# Patient Record
Sex: FEMALE | Race: WHITE | NOT HISPANIC OR LATINO
[De-identification: names, ages, dates, MRNs, and addresses within clinical notes are randomized per-mention and may not be internally consistent; named-entity substitution may affect disease eponyms.]

---

## 2017-06-28 ENCOUNTER — HOSPITAL (OUTPATIENT)
Dept: OTHER | Age: 37
End: 2017-06-28
Attending: INTERNAL MEDICINE

## 2018-09-27 ENCOUNTER — HOSPITAL (OUTPATIENT)
Dept: OTHER | Age: 38
End: 2018-09-27
Attending: INTERNAL MEDICINE

## 2019-03-30 ENCOUNTER — HOSPITAL (OUTPATIENT)
Dept: OTHER | Age: 39
End: 2019-03-30
Attending: OBSTETRICS & GYNECOLOGY

## 2019-04-05 ENCOUNTER — HOSPITAL (OUTPATIENT)
Dept: OTHER | Age: 39
End: 2019-04-05
Attending: OBSTETRICS & GYNECOLOGY

## 2019-10-14 ENCOUNTER — HOSPITAL (OUTPATIENT)
Dept: OTHER | Age: 39
End: 2019-10-14
Attending: SURGERY

## 2019-10-14 PROCEDURE — 99244 OFF/OP CNSLTJ NEW/EST MOD 40: CPT | Performed by: SURGERY

## 2019-11-18 ENCOUNTER — HOSPITAL (OUTPATIENT)
Dept: OTHER | Age: 39
End: 2019-11-18
Attending: SURGERY

## 2021-06-25 ENCOUNTER — OFFICE VISIT (OUTPATIENT)
Dept: DERMATOLOGY CLINIC | Facility: CLINIC | Age: 41
End: 2021-06-25
Payer: COMMERCIAL

## 2021-06-25 DIAGNOSIS — L42 PITYRIASIS ROSEA: Primary | ICD-10-CM

## 2021-06-25 PROCEDURE — 99203 OFFICE O/P NEW LOW 30 MIN: CPT | Performed by: PHYSICIAN ASSISTANT

## 2021-06-25 NOTE — PROGRESS NOTES
HPI:    Patient ID: Yu Werner is a 36year old female. Patient presents with itchy rash all over body for the past 2 weeks. Has been using clobetasol without relief. No new products noted. No recent illness noted.  Patient denies personal or family h

## 2021-09-22 ENCOUNTER — APPOINTMENT (OUTPATIENT)
Dept: GENERAL RADIOLOGY | Age: 41
End: 2021-09-22
Attending: PHYSICIAN ASSISTANT
Payer: COMMERCIAL

## 2021-09-22 ENCOUNTER — HOSPITAL ENCOUNTER (OUTPATIENT)
Age: 41
Discharge: HOME | End: 2021-09-22
Payer: COMMERCIAL

## 2021-09-22 VITALS
RESPIRATION RATE: 21 BRPM | TEMPERATURE: 98 F | OXYGEN SATURATION: 100 % | DIASTOLIC BLOOD PRESSURE: 68 MMHG | HEART RATE: 73 BPM | SYSTOLIC BLOOD PRESSURE: 121 MMHG

## 2021-09-22 DIAGNOSIS — J06.9 VIRAL URI WITH COUGH: Primary | ICD-10-CM

## 2021-09-22 PROCEDURE — 99203 OFFICE O/P NEW LOW 30 MIN: CPT

## 2021-09-22 PROCEDURE — 71046 X-RAY EXAM CHEST 2 VIEWS: CPT | Performed by: PHYSICIAN ASSISTANT

## 2021-09-22 PROCEDURE — 99213 OFFICE O/P EST LOW 20 MIN: CPT

## 2021-09-22 RX ORDER — BENZONATATE 100 MG/1
100 CAPSULE ORAL 3 TIMES DAILY PRN
Qty: 21 CAPSULE | Refills: 0 | Status: SHIPPED | OUTPATIENT
Start: 2021-09-22 | End: 2021-09-22

## 2021-09-22 RX ORDER — ALBUTEROL SULFATE 90 UG/1
2 AEROSOL, METERED RESPIRATORY (INHALATION) EVERY 4 HOURS PRN
Qty: 8 G | Refills: 0 | Status: SHIPPED | OUTPATIENT
Start: 2021-09-22 | End: 2021-10-22

## 2021-09-22 RX ORDER — BENZONATATE 100 MG/1
100 CAPSULE ORAL 3 TIMES DAILY PRN
Qty: 21 CAPSULE | Refills: 0 | Status: SHIPPED | OUTPATIENT
Start: 2021-09-22 | End: 2021-09-29

## 2021-09-22 NOTE — ED INITIAL ASSESSMENT (HPI)
Pt c/o sinus pain/pressure x 5 days, chest congestion and cough x 2 days.  Had negative covid over the weekend

## 2021-09-22 NOTE — ED PROVIDER NOTES
Patient Seen in: [facility]      History   Patient presents with:  Cough/URI    Stated Complaint: COUGH    Subjective:   HPI    40 yo female here for evaluation of cough, congestion, sore throat. Pt reports symptoms x 5 days.   Denies fever/c oriented to person, place, and time.    Psychiatric:         Mood and Affect: Mood normal.         Behavior: Behavior normal.             ED Course   Labs Reviewed - No data to display      26-year-old female here for evaluation of nasal congestion, cough,

## 2022-08-20 ENCOUNTER — OFFICE VISIT (OUTPATIENT)
Dept: FAMILY MEDICINE CLINIC | Facility: CLINIC | Age: 42
End: 2022-08-20
Payer: COMMERCIAL

## 2022-08-20 VITALS
DIASTOLIC BLOOD PRESSURE: 69 MMHG | RESPIRATION RATE: 16 BRPM | SYSTOLIC BLOOD PRESSURE: 115 MMHG | OXYGEN SATURATION: 100 % | TEMPERATURE: 99 F | HEART RATE: 71 BPM

## 2022-08-20 DIAGNOSIS — J02.9 SORE THROAT: Primary | ICD-10-CM

## 2022-08-20 DIAGNOSIS — Z20.818 EXPOSURE TO STREP THROAT: ICD-10-CM

## 2022-08-20 DIAGNOSIS — B34.9 VIRAL SYNDROME: ICD-10-CM

## 2022-08-20 LAB
CONTROL LINE PRESENT WITH A CLEAR BACKGROUND (YES/NO): YES YES/NO
KIT LOT #: NORMAL NUMERIC

## 2022-08-20 PROCEDURE — 87081 CULTURE SCREEN ONLY: CPT | Performed by: PHYSICIAN ASSISTANT

## 2022-08-20 RX ORDER — AMOXICILLIN 875 MG/1
875 TABLET, COATED ORAL 2 TIMES DAILY
Qty: 20 TABLET | Refills: 0 | Status: SHIPPED | OUTPATIENT
Start: 2022-08-20 | End: 2022-08-30

## 2022-08-20 RX ORDER — FLUCONAZOLE 150 MG/1
150 TABLET ORAL ONCE
Qty: 1 TABLET | Refills: 0 | Status: SHIPPED | OUTPATIENT
Start: 2022-08-20 | End: 2022-08-20

## 2022-08-21 LAB — SARS-COV-2 RNA RESP QL NAA+PROBE: NOT DETECTED

## 2022-09-02 PROBLEM — D24.1 FIBROADENOMA OF RIGHT BREAST IN FEMALE: Status: ACTIVE | Noted: 2020-02-03

## 2022-10-28 ENCOUNTER — OFFICE VISIT (OUTPATIENT)
Dept: FAMILY MEDICINE CLINIC | Facility: CLINIC | Age: 42
End: 2022-10-28
Payer: COMMERCIAL

## 2022-10-28 VITALS
DIASTOLIC BLOOD PRESSURE: 74 MMHG | BODY MASS INDEX: 23.07 KG/M2 | TEMPERATURE: 99 F | RESPIRATION RATE: 18 BRPM | HEART RATE: 85 BPM | HEIGHT: 62 IN | SYSTOLIC BLOOD PRESSURE: 114 MMHG | WEIGHT: 125.38 LBS | OXYGEN SATURATION: 100 %

## 2022-10-28 DIAGNOSIS — J20.9 ACUTE BRONCHITIS, UNSPECIFIED ORGANISM: Primary | ICD-10-CM

## 2022-10-28 PROCEDURE — 3008F BODY MASS INDEX DOCD: CPT | Performed by: NURSE PRACTITIONER

## 2022-10-28 PROCEDURE — 3074F SYST BP LT 130 MM HG: CPT | Performed by: NURSE PRACTITIONER

## 2022-10-28 PROCEDURE — 3078F DIAST BP <80 MM HG: CPT | Performed by: NURSE PRACTITIONER

## 2022-10-28 PROCEDURE — 99213 OFFICE O/P EST LOW 20 MIN: CPT | Performed by: NURSE PRACTITIONER

## 2022-10-28 RX ORDER — PREDNISONE 20 MG/1
40 TABLET ORAL DAILY
Qty: 10 TABLET | Refills: 0 | Status: SHIPPED | OUTPATIENT
Start: 2022-10-28 | End: 2022-11-02

## 2022-10-28 RX ORDER — ALBUTEROL SULFATE 90 UG/1
2 AEROSOL, METERED RESPIRATORY (INHALATION) EVERY 4 HOURS PRN
Qty: 1 EACH | Refills: 0 | Status: SHIPPED | OUTPATIENT
Start: 2022-10-28

## 2023-02-19 ENCOUNTER — OFFICE VISIT (OUTPATIENT)
Dept: FAMILY MEDICINE CLINIC | Facility: CLINIC | Age: 43
End: 2023-02-19
Payer: COMMERCIAL

## 2023-02-19 VITALS
BODY MASS INDEX: 21.49 KG/M2 | TEMPERATURE: 98 F | HEART RATE: 83 BPM | SYSTOLIC BLOOD PRESSURE: 112 MMHG | WEIGHT: 116.81 LBS | HEIGHT: 62 IN | RESPIRATION RATE: 18 BRPM | DIASTOLIC BLOOD PRESSURE: 62 MMHG | OXYGEN SATURATION: 100 %

## 2023-02-19 DIAGNOSIS — J02.0 STREP PHARYNGITIS: Primary | ICD-10-CM

## 2023-02-19 LAB
CONTROL LINE PRESENT WITH A CLEAR BACKGROUND (YES/NO): YES YES/NO
STREP GRP A CUL-SCR: POSITIVE

## 2023-02-19 RX ORDER — AMOXICILLIN 500 MG/1
500 CAPSULE ORAL 2 TIMES DAILY
Qty: 20 CAPSULE | Refills: 0 | Status: SHIPPED | OUTPATIENT
Start: 2023-02-19 | End: 2023-03-01

## 2023-02-19 RX ORDER — FLUCONAZOLE 150 MG/1
150 TABLET ORAL ONCE
Qty: 2 TABLET | Refills: 0 | Status: SHIPPED | OUTPATIENT
Start: 2023-02-19 | End: 2023-02-19

## 2023-05-05 ENCOUNTER — OFFICE VISIT (OUTPATIENT)
Dept: HEMATOLOGY/ONCOLOGY | Facility: HOSPITAL | Age: 43
End: 2023-05-05
Attending: INTERNAL MEDICINE
Payer: COMMERCIAL

## 2023-05-05 VITALS
HEIGHT: 62 IN | SYSTOLIC BLOOD PRESSURE: 108 MMHG | DIASTOLIC BLOOD PRESSURE: 68 MMHG | OXYGEN SATURATION: 98 % | BODY MASS INDEX: 20.89 KG/M2 | TEMPERATURE: 98 F | RESPIRATION RATE: 16 BRPM | HEART RATE: 85 BPM | WEIGHT: 113.5 LBS

## 2023-05-05 DIAGNOSIS — Z12.39 BREAST CANCER SCREENING, HIGH RISK PATIENT: ICD-10-CM

## 2023-05-05 DIAGNOSIS — Z98.82 H/O BILATERAL BREAST IMPLANTS: ICD-10-CM

## 2023-05-05 DIAGNOSIS — Z91.89 AT HIGH RISK FOR BREAST CANCER: Primary | ICD-10-CM

## 2023-05-05 DIAGNOSIS — Z80.3 FAMILY HISTORY OF BREAST CANCER IN FIRST DEGREE RELATIVE: ICD-10-CM

## 2023-05-05 DIAGNOSIS — Z12.31 SCREENING MAMMOGRAM FOR BREAST CANCER: ICD-10-CM

## 2023-05-05 PROCEDURE — 99211 OFF/OP EST MAY X REQ PHY/QHP: CPT

## 2023-09-27 ENCOUNTER — HOSPITAL ENCOUNTER (OUTPATIENT)
Dept: MAMMOGRAPHY | Facility: HOSPITAL | Age: 43
Discharge: HOME | End: 2023-09-27
Attending: INTERNAL MEDICINE
Payer: COMMERCIAL

## 2023-09-27 DIAGNOSIS — Z12.31 SCREENING MAMMOGRAM FOR BREAST CANCER: ICD-10-CM

## 2023-09-27 PROCEDURE — 77067 SCR MAMMO BI INCL CAD: CPT | Performed by: INTERNAL MEDICINE

## 2023-09-27 PROCEDURE — 77063 BREAST TOMOSYNTHESIS BI: CPT | Performed by: INTERNAL MEDICINE

## 2023-11-07 ENCOUNTER — OFFICE VISIT (OUTPATIENT)
Dept: HEMATOLOGY/ONCOLOGY | Facility: HOSPITAL | Age: 43
End: 2023-11-07
Attending: INTERNAL MEDICINE
Payer: COMMERCIAL

## 2023-11-07 VITALS
TEMPERATURE: 98 F | DIASTOLIC BLOOD PRESSURE: 51 MMHG | OXYGEN SATURATION: 100 % | HEIGHT: 62 IN | RESPIRATION RATE: 16 BRPM | WEIGHT: 111.19 LBS | HEART RATE: 86 BPM | BODY MASS INDEX: 20.46 KG/M2 | SYSTOLIC BLOOD PRESSURE: 111 MMHG

## 2023-11-07 DIAGNOSIS — Z91.89 AT HIGH RISK FOR BREAST CANCER: Primary | ICD-10-CM

## 2023-11-07 DIAGNOSIS — Z80.3 FAMILY HISTORY OF BREAST CANCER IN FIRST DEGREE RELATIVE: ICD-10-CM

## 2023-11-07 PROCEDURE — 99213 OFFICE O/P EST LOW 20 MIN: CPT | Performed by: INTERNAL MEDICINE

## 2024-03-11 ENCOUNTER — HOSPITAL ENCOUNTER (OUTPATIENT)
Dept: MRI IMAGING | Facility: HOSPITAL | Age: 44
Discharge: HOME | End: 2024-03-11
Attending: INTERNAL MEDICINE
Payer: COMMERCIAL

## 2024-03-11 DIAGNOSIS — Z12.39 BREAST CANCER SCREENING, HIGH RISK PATIENT: ICD-10-CM

## 2024-03-11 PROCEDURE — A9575 INJ GADOTERATE MEGLUMI 0.1ML: HCPCS | Performed by: INTERNAL MEDICINE

## 2024-03-11 PROCEDURE — 77049 MRI BREAST C-+ W/CAD BI: CPT | Performed by: INTERNAL MEDICINE

## 2024-03-11 RX ORDER — DIPHENHYDRAMINE HYDROCHLORIDE 50 MG/ML
10 INJECTION, SOLUTION INTRAMUSCULAR; INTRAVENOUS
Status: COMPLETED | OUTPATIENT
Start: 2024-03-11 | End: 2024-03-11

## 2024-03-11 RX ADMIN — DIPHENHYDRAMINE HYDROCHLORIDE 10 ML: 50 INJECTION, SOLUTION INTRAMUSCULAR; INTRAVENOUS at 14:05:00

## 2024-04-08 ENCOUNTER — OFFICE VISIT (OUTPATIENT)
Dept: HEMATOLOGY/ONCOLOGY | Facility: HOSPITAL | Age: 44
End: 2024-04-08
Attending: INTERNAL MEDICINE
Payer: COMMERCIAL

## 2024-04-08 VITALS
OXYGEN SATURATION: 100 % | RESPIRATION RATE: 16 BRPM | HEIGHT: 62 IN | TEMPERATURE: 98 F | DIASTOLIC BLOOD PRESSURE: 61 MMHG | BODY MASS INDEX: 21.16 KG/M2 | HEART RATE: 81 BPM | SYSTOLIC BLOOD PRESSURE: 116 MMHG | WEIGHT: 115 LBS

## 2024-04-08 DIAGNOSIS — Z91.89 AT HIGH RISK FOR BREAST CANCER: Primary | ICD-10-CM

## 2024-04-08 DIAGNOSIS — Z12.31 SCREENING MAMMOGRAM FOR BREAST CANCER: ICD-10-CM

## 2024-04-08 DIAGNOSIS — Z12.39 BREAST CANCER SCREENING, HIGH RISK PATIENT: ICD-10-CM

## 2024-04-08 DIAGNOSIS — Z98.82 H/O BILATERAL BREAST IMPLANTS: ICD-10-CM

## 2024-04-08 PROCEDURE — 99211 OFF/OP EST MAY X REQ PHY/QHP: CPT

## 2024-04-08 NOTE — PROGRESS NOTES
ZACHERY Ramires is a 43 year old female here for follow up of:  Encounter Diagnoses   Name Primary?    At high risk for breast cancer Yes    H/O bilateral breast implants     Screening mammogram for breast cancer     Breast cancer screening, high risk patient        Patient here for high risk breast evaluation due to her family history.  She follows at [facility] for her breast imaging, and on May 2022 she was evaluated by breast surgery.  She was having MRIs for high risk following at [facility] and had a right breast mass, which was consistent with a fibroadenoma.  The patient also has history of breast augmentation in 2008, and had met with plastic surgery for consideration of removal of the old implants and replacement with new ones.    Patient does complain of bilateral breast pain, which has remained stable.  She denied any further breast masses, nipple discharge, skin changes, or axillary masses.    Her mother had history of breast cancer at age 50, she is still alive, her maternal and had breast cancer at 49.  She states her mother had testing for BRCA and this was negative.  Her mother was tested again 4 yrs ago for new mutations and these were negative. Given that her mother was negative she was not recommended for genetic testing.     She was felt to be increased risk of breast cancer based on her family history, she did have a benign breast biopsy.  At the time of visit with Dr. Garvey, patient was interested in risk reducing mastectomies.  It was discussed with her at the time her risk estimates and it was recommended against prophylactic mastectomies.  It was discussed with the patient that risk-reducing surgery was indicated for patients with high risk genetic mutations, and for some patients with LCIS and significant family history.  Patient was to return to clinic with annual mammogram, and breast exam.  She also was to have annual breast MRIs.  Was also previously  evaluated at [facility] for the same.  This was back in 2019 when she initially presented that she was seen at [facility] by Dr. Ramires.  At that time her Salome score was 0.9% 5-year risk, therefore, she did not meet criteria for chemoprevention.  Initial presentation, her Ozzie Hernandez model lifetime risk was 28%, 10-year risk was 3.1%, per Salome model lifetime risk was 19.4% and 5-year risk was 0.9%.    She is on a high risk surveillance protocol given that her lifetime risk of breast cancer is greater than or equal to 20%, and is already undergoing alternating yearly mammogram with yearly MRI.    She states some pain that is mostly at night on the R breast or if has bag on the R arm.  States she still has breast tenderness and constant nipple sensitivity.  Hard time sleeping at night.  States has to position arms to sleep.  Sleep bras don't help.     Notices the R breast is higher.  Has a ripple on the R breast.  No other skin changes.      States that R breast feels harder than the L breast.      Breast Cancer Risk factors:    Age at menarche:  13  Menstrual Status:  Menstruates: Regularly    G 4 P 3  Age of first live birth:  29  Age of last live birth:  35     History of breastfeeding:  Yes.   Lifetime duration:   1 month        Hormonal contraceptive use:  Yes.  Lifetime duration:   6 years          Infertility treatments:  No                                                                                                                Previous benign biopsies:  Yes, benign  Prior breast surgery:   Yes, breast augmentation/implants 2008    Family Cancer History:  Breast Cancer-related family history includes Breast Cancer (age of onset: 46) in her maternal aunt; Breast Cancer (age of onset: 49) in her mother; Breast Cancer (age of onset: 50) in her maternal aunt and maternal aunt.    Ashkenazi Mandaeism Ancestry:   No                 Cancer Genetic Testing:  No, as above, mother tested  negative x2.      Review of Systems:   Review of Systems   Constitutional:  Positive for fatigue. Negative for appetite change and unexpected weight change.   Respiratory:  Negative for cough and shortness of breath.    Cardiovascular:  Negative for chest pain.   Gastrointestinal:  Negative for abdominal pain.   Genitourinary:          IUD in 2023 due to menorrhagia.  Has improved.    Musculoskeletal:  Negative for arthralgias and back pain.   Neurological:  Positive for headaches (chronic migraines, states these have been worse.). Negative for dizziness.   Hematological:  Negative for adenopathy.        Beta thalassemia trait states chronic anemia.   Psychiatric/Behavioral:  Positive for sleep disturbance.            Current Outpatient Medications   Medication Sig Dispense Refill    NP THYROID 30 MG Oral Tab TAKE 1 TABLET BY MOUTH EVERY DAY 90 tablet 0    UBRELVY 100 MG Oral Tab TAKE 1 TABLET ORALLY ONCE PER DAY AT ONSET OF MIGRAINE FOR 30 DAYS       Allergies:   Allergies   Allergen Reactions    Radiology Contrast Iodinated Dyes ANAPHYLAXIS    Shellfish ANAPHYLAXIS       Past Medical History:   Diagnosis Date    Acute, but ill-defined, cerebrovascular disease     Diabetes (HCC)     Essential hypertension      Past Surgical History:   Procedure Laterality Date          HERNIA SURGERY      NEEDLE BIOPSY RIGHT       Social History     Socioeconomic History    Marital status:    Tobacco Use    Smoking status: Never    Smokeless tobacco: Never   Vaping Use    Vaping Use: Never used   Substance and Sexual Activity    Alcohol use: Yes    Drug use: Never       Family History   Problem Relation Age of Onset    Breast Cancer Mother 49        BRCA negative    Brain Cancer Maternal Grandfather     Breast Cancer Maternal Aunt 46    Breast Cancer Maternal Aunt 50    Breast Cancer Maternal Aunt 50    Prostate Cancer Maternal Uncle 52         PHYSICAL EXAM:    /61 (BP Location: Left arm, Patient  Position: Sitting, Cuff Size: adult)   Pulse 81   Temp 97.6 °F (36.4 °C) (Oral)   Resp 16   Ht 1.575 m (5' 2\")   Wt 52.2 kg (115 lb)   LMP 08/23/2023 (Approximate)   SpO2 100%   BMI 21.03 kg/m²     Physical Exam  General: Patient is alert, not in acute distress.  HEENT: EOMs intact. PERRL.  Neck: No JVD. No palpable lymphadenopathy. Neck is supple.  Chest: Clear to auscultation.  Breasts: B implants with periareolar scars.  No masses. Scar tissue below implant prominent at the UOQ of the R breast.   Heart: Regular rate and rhythm.   Abdomen: Soft, non tender with good bowel sounds.  Extremities: No edema.  Neurological: Grossly intact.   Lymphatics: There is no palpable lymphadenopathy throughout in the cervical, supraclavicular, axillary, or inguinal regions.  Psych/Depression: nl        ASSESSMENT/PLAN:     1. At high risk for breast cancer    2. H/O bilateral breast implants    3. Screening mammogram for breast cancer    4. Breast cancer screening, high risk patient        Patient initially presented 2019.  When she initially presented that she was seen at [facility] by Dr. Ramires.  At that time her Salome score was 0.9% 5-year risk, therefore, she did not meet criteria for chemoprevention.  Initial presentation, her Tyer David model lifetime risk was 28%, 10-year risk was 3.1%, per Salome model lifetime risk was 19.4% and 5-year risk was 0.9%.    She is on a high risk surveillance protocol given that her lifetime risk of breast cancer is greater than or equal to 20%, and is already undergoing alternating yearly mammogram with yearly MRI.      States overdue for imaging.  Last mammogram in February of 2021.  MRI in February of 2022 with bx c/w fibroadenoma and PASH.    Patient completed bilateral mammogram September 2023 which was BI-RADS 2.  She will be due for yearly mammogram in September 2024.    She was due for MRI of the breasts March 2024 benign, due in 1 year.        States that her  implants were recalled, and placed in 2008.  She states she was told at [facility] plastic surgeon that this would not be covered by insurance.  Referral for plastic surgery was given to the patient    She has been encouraged to contact the office with any questions/concerns prior to her next appointment.    MDM low risk      No orders of the defined types were placed in this encounter.      Results From Past 48 Hours:  No results found for this or any previous visit (from the past 48 hour(s)).    Imaging & Referrals:  None   No orders of the defined types were placed in this encounter.    COMPARISON: [facility], [facility] RIVERA 2D+3D SCREENING AUGMT BILAT (76287/07546), 9/27/2023, 2:29 PM.  External Exams, ZIGGY POST PROCEDURAL IMAGE RIGHT (CPT=77065), 4/01/2022, 1:27 PM.  External Exams, US BREAST GUIDED CLIP PLACEMENT RT   (CPT=19285), 4/01/2022, 12:48 PM.  External Exams, ZIGGY RIVERA 2D+3D DIAGNOSTIC ZIGGY BILAT (DZH=65093/84656), 3/04/2022, 1:48 PM.  External Exams, US BREAST LEFT LIMITED  (CPT=76642), 2/22/2021, 2:27 PM.  External Exams, ZIGGY RIVERA 2D+3D DIAGNOSTIC ZIGGY BILAT  (QGR=66652/61182), 2/22/2021, 1:23 PM.  External Exams, ZIGGY RIVERA 2D+3D DIAGNOSTIC ZIGGY RIGHT (CPT=77065/61797), 7/02/2020, 10:18 AM.  External Exams, MRI BREAST BIOPSY W/CLIP 1 SITE RIGHT (CPT=19085), 1/07/2020, 9:47 AM.  External Exams, US BREAST RIGHT  LIMITED (CPT=76642), 1/06/2020, 11:12 AM.  External Exams, MRI BREAST (W+WO) W/CAD BILAT (CPT=77049), 12/09/2019, 9:26 AM.  External Exams, ZIGGY POST PROCEDURAL IMAGE RIGHT (CPT=77065), 11/25/2019, 1:38 PM.  External Exams, US BREAST BIOPSY 1 SITE RIGHT  (CPT=19083), 11/25/2019, 1:03 PM.  External Exams, US BREAST RIGHT LIMITED (CPT=76642), 11/25/2019, 11:58 AM.  External Exams, ZIGGY DIAGNOSTIC  RIGHT  (CPT=77065), 11/25/2019, 11:35 AM.  External Exams, US BREAST RIGHT LIMITED (CPT=76642), 3/04/2022, 2:18   PM.  External Exams, MRI BREAST (ROUTINE), BILATERAL (CPT=77059), 2/24/2022, 1:17  PM.     INDICATIONS: Z12.39 Breast cancer screening, high risk patient     BREAST COMPOSITION: Category d-Extremely dense, which lowers the sensitivity of mammography.     TECHNIQUE: Breast MRI was performed using dynamic intravenous gadolinium infusion, thin sections, and a dedicated breast coil.  Contrast enhancement analysis was assisted by computer-aided detection.       ENHANCEMENT PATTERN:   Marked, with >75% of glandular tissue demonstrating enhancement.       FINDINGS:     LMP:  03/04/2024     Of note:  (#/#)= (series/image number)  All measurements are listed in AP x trans x CC     There are bilateral prepectoral silicone breast prostheses, without evidence of extracapsular rupture.  There is a small amount of symmetric fluid signal along radial folds of both breast prostheses, similar to prior, which may be secondary to dual lumen   implant configuration.  No evidence of extracapsular implant rupture.     Right breast:    There is susceptibility artifact in the upper outer right breast corresponding to enhancing mass at the site of previous benign biopsy (series 12 image 21).  There is susceptibility artifact in the upper outer right breast corresponding to an enhancing  mass at the site a more remote prior benign biopsy (series 12, image 24).     There are non-unique foci of enhancement throughout the breast, most compatible with background enhancement. No abnormal enhancement is seen.       No abnormal right axillary or right internal mammary lymph nodes are seen.     Left breast:       There are non-unique foci of enhancement throughout the breast, most compatible with background enhancement.  No abnormal enhancement is seen.       No abnormal left axillary or left internal mammary lymph nodes are seen.     Extra mammary findings:  Trace amount of bilateral pleural fluid is thought to be physiologic.                 Impression  CONCLUSION:       No MRI evidence of malignancy in either breast.     Annual  screening breast MRI in conjunction with screening mammogram at alternating 6 month intervals is recommended, due to patient's high-risk status.        BI-RADS CATEGORY:  DIAGNOSTIC CATEGORY 2--BENIGN FINDING:       RECOMMENDATIONS:  ROUTINE MAMMOGRAM AND CLINICAL EVALUATION IN 6 MONTHS.       RETURN TO ANNUAL SCREENING IN 6 MONTHS BILATERAL BREASTS       SCREENING MRI OF THE BREAST IN ONE YEAR       PLEASE NOTE: A NORMAL MRI DOES NOT EXCLUDE THE POSSIBILITY OF BREAST CANCER.  A CLINICALLY SUSPICIOUS PALPABLE LUMP SHOULD BE BIOPSIED.                         Dictated by (CST): Karin Blair MD on 3/12/2024 at 6:56 AM      Finalized by (CST): Karin Blair MD on 3/12/2024 at 7:38 AM

## 2024-05-20 ENCOUNTER — HOSPITAL ENCOUNTER (OUTPATIENT)
Dept: ULTRASOUND IMAGING | Facility: HOSPITAL | Age: 44
Discharge: HOME | End: 2024-05-20
Attending: INTERNAL MEDICINE

## 2024-05-20 DIAGNOSIS — E04.1 CYSTIC THYROID NODULE: ICD-10-CM

## 2024-05-20 PROCEDURE — 76536 US EXAM OF HEAD AND NECK: CPT | Performed by: INTERNAL MEDICINE

## 2024-06-20 ENCOUNTER — HOSPITAL ENCOUNTER (OUTPATIENT)
Dept: CT IMAGING | Age: 44
Discharge: HOME | End: 2024-06-20
Attending: INTERNAL MEDICINE

## 2024-06-20 DIAGNOSIS — R22.1 NECK MASS: ICD-10-CM

## 2024-06-20 LAB
CREAT BLD-MCNC: 0.9 MG/DL
EGFRCR SERPLBLD CKD-EPI 2021: 81 ML/MIN/1.73M2 (ref 60–?)

## 2024-06-20 PROCEDURE — 82565 ASSAY OF CREATININE: CPT

## 2024-06-20 PROCEDURE — 70491 CT SOFT TISSUE NECK W/DYE: CPT | Performed by: INTERNAL MEDICINE

## 2024-10-09 ENCOUNTER — OFFICE VISIT (OUTPATIENT)
Dept: HEMATOLOGY/ONCOLOGY | Facility: HOSPITAL | Age: 44
End: 2024-10-09
Attending: INTERNAL MEDICINE
Payer: COMMERCIAL

## 2024-10-09 VITALS
BODY MASS INDEX: 21.57 KG/M2 | RESPIRATION RATE: 16 BRPM | DIASTOLIC BLOOD PRESSURE: 65 MMHG | WEIGHT: 117.19 LBS | OXYGEN SATURATION: 100 % | HEIGHT: 62 IN | TEMPERATURE: 99 F | SYSTOLIC BLOOD PRESSURE: 103 MMHG | HEART RATE: 93 BPM

## 2024-10-09 DIAGNOSIS — Z12.39 BREAST CANCER SCREENING OTHER THAN MAMMOGRAM: ICD-10-CM

## 2024-10-09 DIAGNOSIS — Z80.3 FAMILY HISTORY OF BREAST CANCER IN FIRST DEGREE RELATIVE: ICD-10-CM

## 2024-10-09 DIAGNOSIS — Z12.39 BREAST CANCER SCREENING, HIGH RISK PATIENT: ICD-10-CM

## 2024-10-09 DIAGNOSIS — Z91.89 AT HIGH RISK FOR BREAST CANCER: Primary | ICD-10-CM

## 2024-10-09 DIAGNOSIS — Z12.31 SCREENING MAMMOGRAM FOR BREAST CANCER: ICD-10-CM

## 2024-10-09 DIAGNOSIS — Z98.82 H/O BILATERAL BREAST IMPLANTS: ICD-10-CM

## 2024-10-09 PROCEDURE — 99213 OFFICE O/P EST LOW 20 MIN: CPT | Performed by: INTERNAL MEDICINE

## 2024-10-09 NOTE — PROGRESS NOTES
ZACHERY   Nereyda Ramires is a 44 year old female here for follow up of:  Encounter Diagnoses   Name Primary?    At high risk for breast cancer Yes    Family history of breast cancer in first degree relative     Breast cancer screening, high risk patient     Screening mammogram for breast cancer     H/O bilateral breast implants     Breast cancer screening other than mammogram        Patient here for high risk breast evaluation due to her family history.  She follows at [facility] for her breast imaging, and on May 2022 she was evaluated by breast surgery.  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  The patient also has history of breast augmentation in 2008, and had met with plastic surgery for consideration of removal of the old implants and replacement with new ones.    Patient does complain of bilateral breast pain, which has remained stable.  She denied any further breast masses, nipple discharge, skin changes, or axillary masses.    Her mother had history of breast cancer at age 50, she is still alive, her maternal and had breast cancer at 49.  She states her mother had testing for BRCA and this was negative.  Her mother was tested again 4 yrs ago for new mutations and these were negative. Given that her mother was negative she was not recommended for genetic testing.     She was felt to be increased risk of breast cancer based on her family history, she did have a benign breast biopsy.  At the time of visit with Dr. Garvey, patient was interested in risk reducing mastectomies.  It was discussed with her at the time her risk estimates and it was recommended against prophylactic mastectomies.  It was discussed with the patient that risk-reducing surgery was indicated for patients with high risk genetic mutations, and for some patients with LCIS and significant family history.  Patient was to return to  clinic with annual mammogram, and breast exam.  She also was to have annual breast MRIs.  Was also previously evaluated at [facility] for the same.  This was back in 2019 when she initially presented that she was seen at [facility] by Dr. Ramires.  At that time her Salome score was 0.9% 5-year risk, therefore, she did not meet criteria for chemoprevention.  Initial presentation, her Tyer David model lifetime risk was 28%, 10-year risk was 3.1%, per Salome model lifetime risk was 19.4% and 5-year risk was 0.9%.    She is on a high risk surveillance protocol given that her lifetime risk of breast cancer is greater than or equal to 20%, and is already undergoing alternating yearly mammogram with yearly MRI.    She states some pain that is mostly at night on the R breast.  States she still has breast tenderness and constant nipple sensitivity.  Hard time sleeping at night.  States has to position arms to sleep.  Sleep bras don't help.     Notices the R breast is higher.  Has a ripple on the R breast.  No other skin changes.      States that R breast feels harder than the L breast.      Breast Cancer Risk factors:    Age at menarche:  13  Menstrual Status: pre menopausal.    G 4 P 3  Age of first live birth:  29  Age of last live birth:  35     History of breastfeeding:  Yes.   Lifetime duration:   1 month        Hormonal contraceptive use:  Yes.  Lifetime duration:   6 years.  Has Mirena IUD will be 3 yrs January or February of 2025.  No periods.       Infertility treatments:  No                                                                                                                Previous benign biopsies:  Yes, benign  Prior breast surgery:   Yes, breast augmentation/implants 2007    Family Cancer History:  Breast Cancer-related family history includes Breast Cancer (age of onset: 46) in her maternal aunt; Breast Cancer (age of onset: 49) in her mother; Breast Cancer (age of onset: 50) in  her maternal aunt and maternal aunt.    Ashkenazi Zoroastrian Ancestry:   No                 Cancer Genetic Testing:  No, as above, mother tested negative x2.      Review of Systems:   Review of Systems   Constitutional:  Positive for fatigue. Negative for appetite change and unexpected weight change.   HENT:   Positive for voice change (hoarse for a month).    Respiratory:  Negative for cough and shortness of breath.    Cardiovascular:  Negative for chest pain.   Gastrointestinal:  Negative for abdominal pain.   Musculoskeletal:  Negative for arthralgias, back pain and neck pain.   Neurological:  Positive for headaches (chronic migraines, states these have improved on a new medication.). Negative for dizziness.   Hematological:  Negative for adenopathy.        Beta thalassemia trait states chronic anemia.   Psychiatric/Behavioral:  Positive for sleep disturbance.            Current Outpatient Medications   Medication Sig Dispense Refill    EPINEPHrine (EPIPEN 2-MARGOTH) 0.3 MG/0.3ML Injection Solution Auto-injector Inject 0.3 mL (1 each total) as directed as needed. 1 each 1    Atogepant (QULIPTA) 10 MG Oral Tab Take 10 mg by mouth daily. 90 tablet 1    pantoprazole (PROTONIX) 40 MG Oral Tab EC Take 1 tablet (40 mg total) by mouth every morning before breakfast. 90 tablet 1    NP THYROID 30 MG Oral Tab TAKE 1 TABLET BY MOUTH EVERY DAY 90 tablet 0    UBRELVY 100 MG Oral Tab TAKE 1 TABLET ORALLY ONCE PER DAY AT ONSET OF MIGRAINE FOR 30 DAYS       Allergies:   Allergies   Allergen Reactions    Shellfish ANAPHYLAXIS       Past Medical History:    Acute, but ill-defined, cerebrovascular disease    Diabetes (HCC)    Essential hypertension     Past Surgical History:   Procedure Laterality Date          Hernia surgery      Needle biopsy right       Social History     Socioeconomic History    Marital status:    Tobacco Use    Smoking status: Never    Smokeless tobacco: Never   Vaping Use    Vaping status: Never Used    Substance and Sexual Activity    Alcohol use: Yes    Drug use: Never       Family History   Problem Relation Age of Onset    Breast Cancer Mother 49        BRCA negative    Brain Cancer Maternal Grandfather     Breast Cancer Maternal Aunt 46    Breast Cancer Maternal Aunt 50    Breast Cancer Maternal Aunt 50    Prostate Cancer Maternal Uncle 52         PHYSICAL EXAM:    /65 (BP Location: Left arm, Patient Position: Sitting, Cuff Size: adult)   Pulse 93   Temp 98.8 °F (37.1 °C) (Oral)   Resp 16   Ht 1.575 m (5' 2\")   Wt 53.2 kg (117 lb 3.2 oz)   SpO2 100%   BMI 21.44 kg/m²   Wt Readings from Last 6 Encounters:   10/09/24 53.2 kg (117 lb 3.2 oz)   04/11/24 52.7 kg (116 lb 3.2 oz)   04/08/24 52.2 kg (115 lb)   11/07/23 50.4 kg (111 lb 3.2 oz)   08/08/23 52.2 kg (115 lb)   06/19/23 52.3 kg (115 lb 6.4 oz)   Physical Exam  General: Patient is alert, not in acute distress.  HEENT: EOMs intact. PERRL.  Neck: No JVD. No palpable lymphadenopathy. Neck is supple.  Chest: Clear to auscultation.  Breasts: B implants with periareolar scars.  No masses. Scar tissue below implant prominent at the UOQ of the R breast.   Heart: Regular rate and rhythm.   Abdomen: Soft, non tender with good bowel sounds.  Extremities: No edema.  Neurological: Grossly intact.   Lymphatics: There is no palpable lymphadenopathy throughout in the cervical, supraclavicular, axillary, or inguinal regions.  Psych/Depression: nl        ASSESSMENT/PLAN:     1. At high risk for breast cancer    2. Family history of breast cancer in first degree relative    3. Breast cancer screening, high risk patient    4. Screening mammogram for breast cancer    5. H/O bilateral breast implants    6. Breast cancer screening other than mammogram        Patient initially presented 2019.  When she initially presented that she was seen at [facility] by Dr. Ramires.  At that time her Salome score was 0.9% 5-year risk, therefore, she did not meet criteria  for chemoprevention.  Initial presentation, her Ozzie Hernandez model lifetime risk was 28%, 10-year risk was 3.1%, per Salome model lifetime risk was 19.4% and 5-year risk was 0.9%.    She is on a high risk surveillance protocol given that her lifetime risk of breast cancer is greater than or equal to 20%, and is already undergoing alternating yearly mammogram with yearly MRI.      States overdue for imaging.  Last mammogram in February of 2021.  MRI in February of 2022 with bx c/w fibroadenoma and PASH.    Patient completed bilateral mammogram September 2023 which was BI-RADS 2.  She will be due for yearly mammogram in September 2024.  Patient advised to have ASAP.    MRI of the breasts March 2024 benign, due in 1 year, March of 2025.        States that her implants were recalled, and placed in 2008.  She states she was told at [facility] plastic surgeon that this would not be covered by insurance.  Referral for plastic surgery was given to the patient    She has been encouraged to contact the office with any questions/concerns prior to her next appointment.    MDM low risk      No orders of the defined types were placed in this encounter.      Results From Past 48 Hours:  No results found for this or any previous visit (from the past 48 hours).    Imaging & Referrals:  MRI BREAST (W+WO) W/CAD BILAT (CPT=77049)   No orders of the defined types were placed in this encounter.    COMPARISON: [facility], [facility] RIVERA 2D+3D SCREENING AUGMT BILAT ([id number]), 9/27/2023, 2:29 PM.  External Exams, [facility] POST PROCEDURAL IMAGE RIGHT (CPT=77065), 4/01/2022, 1:27 PM.  External Exams, US BREAST GUIDED CLIP PLACEMENT RT   (CPT=19285), 4/01/2022, 12:48 PM.  External Exams, [facility] RIVERA 2D+3D DIAGNOSTIC ZIGGY BILAT (HBT=10060/52446), 3/04/2022, 1:48 PM.  External Exams, US BREAST LEFT LIMITED  (CPT=76642), 2/22/2021, 2:27 PM.  External Exams, [facility] RIVERA 2D+3D DIAGNOSTIC ZIGGY BILAT  (RSM=[id number]/01743), 2/22/2021, 1:23 PM.  External  Exams, ZIGGY RIVERA 2D+3D DIAGNOSTIC ZIGGY RIGHT (CPT=77065/27069), 7/02/2020, 10:18 AM.  External Exams, MRI BREAST BIOPSY W/CLIP 1 SITE RIGHT (CPT=19085), 1/07/2020, 9:47 AM.  External Exams, US BREAST RIGHT  LIMITED (CPT=76642), 1/06/2020, 11:12 AM.  External Exams, MRI BREAST (W+WO) W/CAD BILAT (CPT=77049), 12/09/2019, 9:26 AM.  External Exams, ZIGGY POST PROCEDURAL IMAGE RIGHT (CPT=77065), 11/25/2019, 1:38 PM.  External Exams, US BREAST BIOPSY 1 SITE RIGHT  (CPT=19083), 11/25/2019, 1:03 PM.  External Exams, US BREAST RIGHT LIMITED (CPT=76642), 11/25/2019, 11:58 AM.  External Exams, ZIGGY DIAGNOSTIC  RIGHT  (CPT=77065), 11/25/2019, 11:35 AM.  External Exams, US BREAST RIGHT LIMITED (CPT=76642), 3/04/2022, 2:18   PM.  External Exams, MRI BREAST (ROUTINE), BILATERAL (CPT=77059), 2/24/2022, 1:17 PM.     INDICATIONS: Z12.39 Breast cancer screening, high risk patient     BREAST COMPOSITION: Category d-Extremely dense, which lowers the sensitivity of mammography.     TECHNIQUE: Breast MRI was performed using dynamic intravenous gadolinium infusion, thin sections, and a dedicated breast coil.  Contrast enhancement analysis was assisted by computer-aided detection.       ENHANCEMENT PATTERN:   Marked, with >75% of glandular tissue demonstrating enhancement.       FINDINGS:     LMP:  03/04/2024     Of note:  (#/#)= (series/image number)  All measurements are listed in AP x trans x CC     There are bilateral prepectoral silicone breast prostheses, without evidence of extracapsular rupture.  There is a small amount of symmetric fluid signal along radial folds of both breast prostheses, similar to prior, which may be secondary to dual lumen   implant configuration.  No evidence of extracapsular implant rupture.     Right breast:    There is susceptibility artifact in the upper outer right breast corresponding to enhancing mass at the site of previous benign biopsy (series 12 image 21).  There is susceptibility artifact in the upper  outer right breast corresponding to an enhancing  mass at the site a more remote prior benign biopsy (series 12, image 24).     There are non-unique foci of enhancement throughout the breast, most compatible with background enhancement. No abnormal enhancement is seen.       No abnormal right axillary or right internal mammary lymph nodes are seen.     Left breast:       There are non-unique foci of enhancement throughout the breast, most compatible with background enhancement.  No abnormal enhancement is seen.       No abnormal left axillary or left internal mammary lymph nodes are seen.     Extra mammary findings:  Trace amount of bilateral pleural fluid is thought to be physiologic.                 Impression  CONCLUSION:       No MRI evidence of malignancy in either breast.     Annual screening breast MRI in conjunction with screening mammogram at alternating 6 month intervals is recommended, due to patient's high-risk status.        BI-RADS CATEGORY:  DIAGNOSTIC CATEGORY 2--BENIGN FINDING:       RECOMMENDATIONS:  ROUTINE MAMMOGRAM AND CLINICAL EVALUATION IN 6 MONTHS.       RETURN TO ANNUAL SCREENING IN 6 MONTHS BILATERAL BREASTS       SCREENING MRI OF THE BREAST IN ONE YEAR       PLEASE NOTE: A NORMAL MRI DOES NOT EXCLUDE THE POSSIBILITY OF BREAST CANCER.  A CLINICALLY SUSPICIOUS PALPABLE LUMP SHOULD BE BIOPSIED.                         Dictated by (CST): Karin Blair MD on 3/12/2024 at 6:56 AM      Finalized by (CST): Karin Blair MD on 3/12/2024 at 7:38 AM

## 2024-10-28 ENCOUNTER — HOSPITAL ENCOUNTER (OUTPATIENT)
Dept: MAMMOGRAPHY | Facility: HOSPITAL | Age: 44
Discharge: HOME | End: 2024-10-28
Attending: INTERNAL MEDICINE
Payer: COMMERCIAL

## 2024-10-28 DIAGNOSIS — Z12.31 SCREENING MAMMOGRAM FOR BREAST CANCER: ICD-10-CM

## 2024-10-28 PROCEDURE — 77063 BREAST TOMOSYNTHESIS BI: CPT | Performed by: INTERNAL MEDICINE

## 2024-10-28 PROCEDURE — 77067 SCR MAMMO BI INCL CAD: CPT | Performed by: INTERNAL MEDICINE

## 2025-01-09 ENCOUNTER — TELEPHONE (OUTPATIENT)
Dept: SURGERY | Facility: CLINIC | Age: 45
End: 2025-01-09

## 2025-01-09 NOTE — TELEPHONE ENCOUNTER
Contacted patient to obtain additional information for her upcoming consultation. Confirmed with patient that she received documentation stating her implants are recalled. Requested she provide this information as well as her implant cards to us via [employer]. Patient verbalized understanding and will send the information.

## 2025-01-14 ENCOUNTER — OFFICE VISIT (OUTPATIENT)
Dept: SURGERY | Facility: CLINIC | Age: 45
End: 2025-01-14
Payer: COMMERCIAL

## 2025-01-14 VITALS
RESPIRATION RATE: 14 BRPM | HEART RATE: 77 BPM | DIASTOLIC BLOOD PRESSURE: 79 MMHG | SYSTOLIC BLOOD PRESSURE: 113 MMHG | HEIGHT: 62 IN | BODY MASS INDEX: 20.61 KG/M2 | OXYGEN SATURATION: 99 % | WEIGHT: 112 LBS

## 2025-01-14 DIAGNOSIS — Z80.3 FAMILY HISTORY OF BREAST CANCER IN FIRST DEGREE RELATIVE: Primary | ICD-10-CM

## 2025-01-14 PROCEDURE — 99204 OFFICE O/P NEW MOD 45 MIN: CPT | Performed by: SURGERY

## 2025-01-14 NOTE — CONSULTS
New Patient Consultation    This is the first visit for this 44 year old female referred for evaluation of her cosmetic breast implants.    History of Present Illness:   The patient is a 44 year old female referred for evaluation of her cosmetic breast implants.  The patient had bilateral subglandular breast augmentation in  by Dr. Remi Curiel.  Review of the patient's implants shows him to be style 120 440 cc silicone filled breast implants.  The patient received a notice about the recall of her textured implants.  She now presents here today to discuss treatment options.  Patient is followed by Dr. Delgado due to her strong family history of breast cancer in her mother, maternal aunt, and multiple paternal great aunts.  Her last breast imaging was mammography which was performed in October and showed benign findings.  The patient was referred here to discuss surgical treatment options.  Specifically, she is interested in the option of implant exchange versus mastectomy and reconstruction.    Past Medical History:      Past Medical History:    Acute, but ill-defined, cerebrovascular disease    Diabetes (HCC)    Essential hypertension         Past Surgical History:  Past Surgical History:   Procedure Laterality Date          Hernia surgery      Needle biopsy right           Medications:    Medications Ordered Prior to Encounter[1]      Allergies:    Allergies[2]      Family History:   Family History   Problem Relation Age of Onset    Breast Cancer Mother 49        BRCA negative    Brain Cancer Maternal Grandfather     Breast Cancer Maternal Aunt 46    Breast Cancer Maternal Aunt 50    Breast Cancer Maternal Aunt 50    Prostate Cancer Maternal Uncle 52         Social History:  History   Alcohol Use    Yes       History   Smoking Status    Never   Smokeless Tobacco    Never       History   Drug Use Unknown           Review of Systems:    General:   The patient denies, fever, chills, night sweats,  fatigue, generalized weakness, change in appetite, weight loss, or weight gain.    Endocrine:   There is no history of poor/slow wound healing, weight loss/gain, fertility or hormone problems, cold intolerance, heat intolerance, excessive thirst, or thyroid disease.     Allergic/Immunologic:  There is no history of hives, hay fever, angioedema or anaphylaxis.    HEENT:    The patient denies ear pain, ear drainage, hearing loss, change in vision, double vision, cataracts, glaucoma, nasal congestion, nosebleed, hoarseness, sore throat, or swollen glands    Respiratory:   The patient denies shortness of breath, cough, bloody cough, phlegm, asthma, or wheezing    Cardiovascular:  The patient denies chest pain/pressure, palpitations, irregular heartbeat, high blood pressure, stroke, or leg swelling    Breasts:  Patient denies breast masses, pain, change in the breast skin, skin dimpling, nipple discharge, or rash    Gastrointestinal:   There is no history of difficulty or pain with swallowing, reflux symptoms, nausea, vomiting, dark/ bloody stools, diarrhea, constipation,  change in bowel habits, or abdominal pain.     Genitourinary:  The patient denies frequent urination, needing to get up at night to urinate, urinary hesitancy or retaining urine, painful urination, urinary incontinence, decreased urine stream, blood in the urine, or vaginal/penile discharge.    Skin:   The patient denies rash, itching, skin lesions, dry skin, change in skin color or change in moles, sunburns, or sunburns with blistering.     Hematologic/Lymphatic:  The patient denies easily bruising or bleeding, persistent swollen glands or lymph nodes, bleeding disorders, blood clots, or pulmonary embolism.     Gynecologic:  The patient denies irregular menses, pelvic pain, pain with intercourse, painful menses, or pregnancy    Musculoskeletal:  The patient denies muscle aches/pain, joint pain, stiff joints, neck pain, back pain or bone  pain.    Neurologic:  There is no history of migraines or severe headaches, seizure/epilepsy, speech problems, coordination problems, trembling/tremors, fainting/black outs, dizziness, memory problems, loss of sensation/numbness, problems walking, weakness, tingling or burning in hands/feet.     Psychiatric:  There is no history of abusive relationship, bipolar disorder, sleep disturbance, anxiety, depression or feeling of despair.    Physical Exam:  Vitals:    01/14/25 1059   BP: 113/79   Pulse: 77   Resp: 14     Body mass index is 20.49 kg/m².,    The patient is awake, alert, and oriented.  She is a well-nourished, well-developed female who appears her stated age.  Her speech patterns and movements are normal, and affect is appropriate.    HEENT: The head is normocephalic. The neck is supple. The thyroid is not enlarged and is without palpable masses.  The trachea is in the midline. Conjunctiva are clear, non-icteric.    Breasts: General Inspection of the breasts shows well-healed circumareolar scars.  A firm subcutaneous capsule is noted bilaterally.  A paucity of breast parenchyma is noted with an approximately 1.5 to 2 cm pinch test throughout.  There are no dominant masses noted bilaterally.  There is no nipple inversion or nipple discharge noted bilaterally.  Measurements: Sternal notch to nipple 24 cm in the left and 23 cm on the right.  Base diameter is 12 cm bilaterally.  Nipple to very fold is 12 cm on the left and 13 cm on the right.    Lymph Nodes:  There is no cervical, supraclavicular, or axillary lymphadenopathy appreciated.    Back: There is no vertebral column tenderness.    Skin: The skin appears normal. There are no suspicious appearing rashes or lesions.    Extremities: The extremities are without deformity, cyanosis or edema.    Impression:   The patient is a 44 year old female with a history of subglandular breast augmentation with round textured implants as well as a strong family history of  breast cancer.    Discussion and Plan:  Surgical treatment options were reviewed with the patient.  To begin, we discussed the option of implant exchange.  Given the paucity of subcutaneous fat, we discussed downsizing to a smaller implant with placement of biosynthetic mesh and secondary mastopexy.  The nature of the mastopexy Wise pattern scars was demonstrated to the patient.  We reviewed the elevated risk of nipple areolar complications including loss of sensation and nipple areolar necrosis given her previous periareolar incisions.  We also discussed the likely need for revisions.  We discussed the general risks of surgery including but not limited to bleeding, infection, scarring, delayed wound healing, loss of nipple sensation, nipple areolar necrosis, mesh palpability, visibility, or extrusion, implant infection or extrusion requiring removal, ALCL, capsular contracture, and need for further surgery.  We discussed expected postoperative course including need for drains, activity limitation, and compression.    Given the patient's desires, we also discussed the option of bilateral prophylactic mastectomy and implant-based reconstruction.We reviewed the options for post-mastectomy reconstruction and discussed the risks/benefits of  timing (immediate versus delayed) and technique (implant-based versus autologous).  Given the patient's previous abdominoplasty, the majority of discussion was focused on implant-based reconstruction. Specifically, the nature and technique of tissue expander reconstruction was reviewed with the patient. We discussed the partial submuscular placement of the tissue expander, use of acellular matrix, and placement of drains.  We discussed that use of acellular dermal matrix in  breast reconstruction is considered an \"off label\" use.  The expansion process, as well as the need for a secondary procedure for placement of a permanent implant, were reviewed.  The risks of surgery including  but not limited to bleeding, infection, scarring, delayed wound healing, nipple areolar necrosis, seroma, asymmetry, implant infection/extrusion requiring removal, injury to adjacent structures, capsular contracture, ALCL, need for implant exchange, and need for further surgery were reviewed. We also discussed the possibility of direct to implant reconstruction based on operative findings. The patient understands that the decision on direct to implant reconstruction will be based on operative findings. She also understand the possible need for revision surgery if direct to implant reconstruction is undertaken. The expected post-operative course was discussed including the need for activity limitation, drains, and suture removal.  We discussed the recommended FDA surveillance protocol for silicone implants.    Multiple questions were answered to the patient's satisfaction. No guarantees as to outcome offered.  The patient would like to meet with Dr. Garcia and will return for preoperative evaluation if she chooses to proceed with mastectomy and reconstruction.  The plan was reviewed with the patient and questions were answered.         [1]   Current Outpatient Medications on File Prior to Visit   Medication Sig Dispense Refill    QULIPTA 10 MG Oral Tab TAKE 10 MG BY MOUTH DAILY. 90 tablet 1    EPINEPHrine (EPIPEN 2-MARGOTH) 0.3 MG/0.3ML Injection Solution Auto-injector Inject 0.3 mL (1 each total) as directed as needed. 1 each 1    pantoprazole (PROTONIX) 40 MG Oral Tab EC Take 1 tablet (40 mg total) by mouth every morning before breakfast. 90 tablet 1    NP THYROID 30 MG Oral Tab TAKE 1 TABLET BY MOUTH EVERY DAY 90 tablet 0    UBRELVY 100 MG Oral Tab TAKE 1 TABLET ORALLY ONCE PER DAY AT ONSET OF MIGRAINE FOR 30 DAYS       No current facility-administered medications on file prior to visit.   [2]   Allergies  Allergen Reactions    Shellfish ANAPHYLAXIS

## 2025-02-26 ENCOUNTER — OFFICE VISIT (OUTPATIENT)
Facility: CLINIC | Age: 45
End: 2025-02-26
Payer: COMMERCIAL

## 2025-02-26 VITALS
BODY MASS INDEX: 21 KG/M2 | RESPIRATION RATE: 16 BRPM | SYSTOLIC BLOOD PRESSURE: 120 MMHG | DIASTOLIC BLOOD PRESSURE: 76 MMHG | WEIGHT: 117 LBS | TEMPERATURE: 97 F | OXYGEN SATURATION: 96 % | HEART RATE: 86 BPM

## 2025-02-26 DIAGNOSIS — D24.1 FIBROADENOMA OF RIGHT BREAST IN FEMALE: ICD-10-CM

## 2025-02-26 DIAGNOSIS — R92.30 DENSE BREAST TISSUE: ICD-10-CM

## 2025-02-26 DIAGNOSIS — Z91.89 AT HIGH RISK FOR BREAST CANCER: Primary | ICD-10-CM

## 2025-02-26 DIAGNOSIS — Z80.3 FAMILY HISTORY OF BREAST CANCER: ICD-10-CM

## 2025-02-26 DIAGNOSIS — Z80.3 FAMILY HISTORY OF BREAST CANCER IN FIRST DEGREE RELATIVE: ICD-10-CM

## 2025-02-26 PROCEDURE — 99204 OFFICE O/P NEW MOD 45 MIN: CPT | Performed by: SURGERY

## 2025-02-26 NOTE — PATIENT INSTRUCTIONS
Dr. Saskia Garcia  Tel: [phone]  Fax: [phone] [place] [address] [place] [address]  [phone]     Surgery/Procedure: Bilateral breast nipple sparing mastectomies (Soniafredimadan) with reconstruction(Brant)       Anesthesia:   Gen +request intraoperative pec block from anesthesia Surgery Length:   2 hours CPT:  97177   Wire LOC:   No   Nuc Med:   No   Pepper Seed:  No       Dx & ICD-10: At high risk for breast cancer (Z91.89), Family history of breast cancer (Z80.3)   Radiology Instructions: N/A   _______________________________________________________________________________    Someone must accompany you the day of the procedure to drive you home safely, because of anesthesia.  You will need an adult  to stay with you the first night following your surgery.  You must remove any kind of makeup, acrylic nails, lotions, powders, creams or deodorant.  [facility] ONLY: Pre-admission will give instruct you on when to take Gatorade and Tylenol/acetaminophen prior to your surgery, purchase 2 - 12oz bottles of regular Gatorade (NOT RED/SUGAR FREE). Otherwise, you may not eat or drink anything else after 11PM the night before surgery.    [facility] ONLY: You may not eat or drink anything after midnight the day of your surgery.   Wear comfortable clothing that can be easily removed.  If you wear dentures, contacts lenses, or any prosthesis, you will be asked to remove them.  Do not drink alcohol or smoke 24 hours prior to your procedure.  Bring a picture ID and your insurance card.  Covid-19 testing is no longer required before surgery unless you are experiencing symptoms such as fever, cough, congestion, etc.   The Pre-Admission Testing Department will call the day before to confirm your procedure, give you the time you need to arrive by and directions on where to go. They begin making calls after 2pm, if you are not contacted by 4pm, please call the surgeon's office listed  above.  Do not take any blood thinners at least one week prior to the procedure/surgery. This includes aspirin, baby aspirin, Ibuprofen products, herbal supplements, diet medications, vitamin E, fish oil and green tea supplements. Please check other supplements for these ingredients. *TYLENOL or acetaminophen is acceptable*  If you take Coumadin, Plavix, Xarelto, or Eliquis, please contact your prescribing physician for special instructions on how long to hold. If you take insulin contact your primary care physician for special instructions.  Our surgery scheduler, Kiah, will be contacting you to discuss surgery dates. If you have any questions related to scheduling your surgery, please reach out to her at [phone].  _____________________________________________________________________  PRE-OPERATIVE TESTING IF INDICATED BELOW  PLEASE COMPLETE ASAP (AT LEAST 14-21 DAYS PRIOR TO SURGERY)  [] CBC [] BMP [] CMP [] EKG    [] PT, PTT, INR [] Cardiac Clearance  [] H&P Medical Clearance [] Chest X-ray     Please call Central Scheduling to schedule an appointment for pre-operative labs/tests once your orders are placed @ [phone]  Does the patient have a pacemaker or ICD?                              Faxitron/Trident in OR?  [] Yes   [x] No                               [] Yes   [x] No

## 2025-02-26 NOTE — PROGRESS NOTES
Breast Surgery New Patient Consultation    This is the first visit for this 44 year old woman, referred by Dr. Noland, who presents for evaluation of high risk for development of breast cancer.    History of Present Illness:   Ms. Nereyda Ramires is a 44 year old woman who presents with high risk for development of breast cancer secondary to her strong family history of breast cancer and history of personal breast biopsies and dense breast tissue.  Acutely, she denies any palpable masses, nipple discharge or axillary symptoms.  She does have consistent pain in her breast.  She has a history of a bilateral breast augmentation from  and has had some pain and scar tissue related to her implants.  She does have a strong family history of breast cancer with no known genetic mutation in her family.  Prior breast biopsies have included biopsies in  and  which demonstrated benign fibroadenoma and at least 1 location.  She had her most recent bilateral screening mammogram on 2024 which showed extremely dense breast tissue with intact biopsy clips of the right breast and no suspicious new changes.  She had a high risk MRI on 2024 that showed intact bilateral prepectoral silicone implants with some fluid along the fold of the prosthesis bilaterally and no suspicious enhancement of the breast tissue on either side.  She has been seen by plastic surgery and was noted to have a recall of her current breast implants.  She is motivated to proceed with a bilateral risk reducing mastectomy to lower future breast cancer risk and presents today to discuss.         Past Medical History:    Beta thalassemia minor    Migraine       Past Surgical History:   Procedure Laterality Date    Abdominoplasty  2016    Dr. Remi Curiel          , ,     Hernia surgery  2016    Needle biopsy right      Other surgical history  2008    bilateral breast augmentation, implants Dr Curiel        Gynecological History:  Pt is a   Pt was 29 years old at time of first pregnancy.    She has cumulative breastfeeding history of 1 months.  She achieved menarche at age 13. and LMP unknown, pt has an IUD  She denies any history of hormone replacement therapy   She has history of oral contraceptive use for 6 years, last in .  She denies infertility treatment to achieve pregnancy.    Medications:    No outpatient medications have been marked as taking for the 25 encounter (Appointment) with Saskia Garcia MD.       Allergies:    Allergies[1]    Family History:   Family History   Problem Relation Age of Onset    Breast Cancer Mother 49        BRCA negative    Brain Cancer Maternal Grandfather     Breast Cancer Maternal Aunt 46    Breast Cancer Maternal Aunt 50    Breast Cancer Maternal Aunt 50    Prostate Cancer Maternal Uncle 52       She is not of Ashkenazi Jainism ancestry.    Social History:  History   Alcohol Use    3.0 standard drinks of alcohol/week    3 Standard drinks or equivalent per week       History   Smoking Status    Never   Smokeless Tobacco    Never     Ms. Nereyda Ramires is  with 3 children. She has 7 siblings. She is currently Employed full-time    Review of Systems:  General:   The patient denies, fever, chills, +night sweats, fatigue, generalized weakness, change in appetite or weight loss.    HEENT:     The patient denies eye irritation, cataracts, redness, glaucoma, yellowing of the eyes, change in vision, color blindness, or +wearing contacts/glasses. The patient denies hearing loss, ringing in the ears, ear drainage, earaches, nasal congestion, nose bleeds, snoring, pain in mouth/throat, hoarseness, change in voice, facial trauma.    Respiratory:  The patient denies chronic cough, phlegm, hemoptysis, pleurisy/chest pain, pneumonia, asthma, wheezing, difficulty in breathing with exertion, emphysema, chronic bronchitis, shortness of breath or abnormal sound when  breathing.     Cardiovascular:  There is no history of chest pain, chest pressure/discomfort, palpitations, irregular heartbeat, fainting or near-fainting, difficulty breathing when lying flat, SOB/Coughing at night, swelling of the legs or chest pain while walking.    Breasts:  See history of present illness    Gastrointestinal:     There is no history of difficulty or pain with swallowing, reflux symptoms, vomiting, dark or bloody stools, constipation, yellowing of the skin, indigestion, nausea, change in bowel habits, diarrhea, abdominal pain or vomiting blood.     Genitourinary:  The patient denies frequent urination, needing to get up at night to urinate, urinary hesitancy or retaining urine, painful urination, urinary incontinence, decreased urine stream, blood in the urine or vaginal/penile discharge.    Skin:    The patient denies rash, itching, skin lesions, dry skin, change in skin color or change in moles.     Hematologic/Lymphatic:  The patient denies easily bruising or bleeding or persistent swollen glands or lymph nodes.     Musculoskeletal:  The patient denies muscle aches/pain, joint pain, stiff joints, neck pain, back pain or bone pain.    Neuropsychiatric:  There is no history of +migraines or severe headaches, seizure/epilepsy, speech problems, coordination problems, trembling/tremors, fainting/black outs, dizziness, memory problems, loss of sensation/numbness, problems walking, weakness, tingling or burning in hands/feet. There is no history of abusive relationship, bipolar disorder, sleep disturbance, anxiety, depression or feeling of despair.    Endocrine:    There is no history of poor/slow wound healing, weight loss/gain, fertility or hormone problems, cold intolerance, thyroid disease.     Allergic/Immunologic:  There is no history of hives, hay fever, angioedema or anaphylaxis.    /76 (BP Location: Left arm, Patient Position: Sitting, Cuff Size: adult)   Pulse 86   Temp 96.8 °F (36  °C) (Temporal)   Resp 16   Wt 53.1 kg (117 lb)   SpO2 96%   BMI 21.40 kg/m²     Physical Exam:  The patient is an alert, oriented, well-nourished and  well-developed woman who appears her stated age. Her speech patterns and movements are normal. Her affect is appropriate.    HEENT: The head is normocephalic. The neck is supple. The thyroid is not enlarged and is without palpable masses/nodules. There are no palpable masses. The trachea is in the midline. Conjunctiva are clear, non-icteric.    Chest: The chest expands symmetrically. The lungs are clear to auscultation.    Heart: The rhythm is regular.  There are no murmurs, rubs, gallops or thrills.    Breasts:  Her breasts are symmetrical with a cup size 32DDD.  There are bilateral well-healed incisions with intact subglandular bilateral breast implants.  Right breast: The skin, nipple ,and areola appear normal. There is no skin dimpling with movement of the pectoralis. There is no nipple retraction. No nipple discharge can be elicited. The parenchyma is mildly nodular. There are no dominant masses in the breast. The axillary tail is normal.  Left breast:   The skin, nipple, and areola appear normal. There is no skin dimpling with movement of the pectoralis. There is no nipple retraction. No nipple discharge can be elicited. The parenchyma is mildly nodular. There are no dominant masses in the breast. The axillary tail is normal.    Abdomen:  The abdomen is soft, flat and non tender. The liver is not enlarged. There are no palpable masses.    Lymph Nodes:  The supraclavicular, axillary and cervical regions are free of significant lymphadenopathy.    Back: There is no vertebral column tenderness.    Skin: The skin appears normal. There are no suspicious appearing rashes or lesions.    Extremities: The extremities are without deformity, cyanosis or edema.    Impression:   Ms. Nereyda Ramires is a 44 year old woman presents with strong family history of breast  cancer, dense breast tissue, history of benign right breast biopsies, history of bilateral breast augmentation with recalled breast implants and breast pain.    Discussion and Plan:  I had a discussion with the Patient regarding her breast exam. On exam today I found no evidence of clinical concern in bilateral breast tissue.  I personally reviewed the recent imaging which is concordant with her clinical exam.  She is due for her next high rescreening MRI in March 2025 and I have advised that she proceed with this prior to any surgical scheduling.  She is interested in a bilateral risk reducing nipple sparing mastectomy for maximal risk reduction given her high risk for development of breast cancer secondary to her family history.  The risks and possible complications of this procedure were discussed at length with the patient.  Additionally, the patient has met with plastic surgery to discuss her reconstructive options.  I will touch base with her with the MRI results prior to surgical planning.  She was given ample opportunity for questions and those questions were answered to her satisfaction. She has been  encouraged to contact the office with any questions or concerns prior to her next appointment.     This note was created by [employer] voice recognition. Errors in content may be related to improper recognition by the system; efforts to review and correct have been done but errors may still exist. Please be advised the primary purpose of this note is for me to communicate medical care. Standard sentence structure is not always used. Medical terminology and medical abbreviations may be used. There may be grammatical, typographical, and automated fill ins that may have errors missed in proofreading.           [1]   Allergies  Allergen Reactions    Shellfish ANAPHYLAXIS

## 2025-02-27 PROBLEM — Z80.3 FAMILY HISTORY OF BREAST CANCER: Status: ACTIVE | Noted: 2025-02-27

## 2025-02-27 PROBLEM — R92.30 DENSE BREAST TISSUE: Status: ACTIVE | Noted: 2025-02-27

## 2025-03-28 ENCOUNTER — HOSPITAL ENCOUNTER (OUTPATIENT)
Dept: MRI IMAGING | Facility: HOSPITAL | Age: 45
Discharge: HOME | End: 2025-03-28
Attending: SURGERY
Payer: COMMERCIAL

## 2025-03-28 DIAGNOSIS — Z91.89 AT HIGH RISK FOR BREAST CANCER: ICD-10-CM

## 2025-03-28 DIAGNOSIS — Z80.3 FAMILY HISTORY OF BREAST CANCER: ICD-10-CM

## 2025-03-28 DIAGNOSIS — R92.30 DENSE BREAST TISSUE: ICD-10-CM

## 2025-03-28 PROCEDURE — 77049 MRI BREAST C-+ W/CAD BI: CPT | Performed by: SURGERY

## 2025-03-28 PROCEDURE — A9575 INJ GADOTERATE MEGLUMI 0.1ML: HCPCS | Performed by: SURGERY

## 2025-03-28 RX ORDER — GADOTERATE MEGLUMINE 376.9 MG/ML
15 INJECTION INTRAVENOUS
Status: COMPLETED | OUTPATIENT
Start: 2025-03-28 | End: 2025-03-28

## 2025-03-28 RX ADMIN — GADOTERATE MEGLUMINE 11 ML: 376.9 INJECTION INTRAVENOUS at 13:16:00

## 2025-03-31 DIAGNOSIS — R92.8 ABNORMAL MRI, BREAST: Primary | ICD-10-CM

## 2025-04-07 ENCOUNTER — HOSPITAL ENCOUNTER (OUTPATIENT)
Dept: ULTRASOUND IMAGING | Facility: HOSPITAL | Age: 45
Discharge: HOME | End: 2025-04-07
Attending: SURGERY
Payer: COMMERCIAL

## 2025-04-07 DIAGNOSIS — R92.8 ABNORMAL MRI, BREAST: ICD-10-CM

## 2025-04-07 PROCEDURE — 76642 ULTRASOUND BREAST LIMITED: CPT | Performed by: SURGERY

## 2025-04-08 ENCOUNTER — TELEPHONE (OUTPATIENT)
Age: 45
End: 2025-04-08

## 2025-04-08 NOTE — TELEPHONE ENCOUNTER
Attempted to reach patient to offer appointment times with Dr. Noland as provided by Sherri LARA. No answer. Left voicemail for patient to call us back to reschedule.

## 2025-04-08 NOTE — TELEPHONE ENCOUNTER
Patient called to cancel her 4/9 follow-up appointment due to being out of town for a business meeting. Patient states she is back on Friday.     Please share any other upcoming time patient could reschedule to see Dr. Noland.     Patient states she sees Dr. Garcia and Dr. Guo upcoming.

## 2025-04-09 ENCOUNTER — APPOINTMENT (OUTPATIENT)
Age: 45
End: 2025-04-09
Attending: INTERNAL MEDICINE
Payer: COMMERCIAL

## 2025-04-15 ENCOUNTER — OFFICE VISIT (OUTPATIENT)
Facility: CLINIC | Age: 45
End: 2025-04-15
Payer: COMMERCIAL

## 2025-04-15 DIAGNOSIS — Z80.3 FAMILY HISTORY OF BREAST CANCER: Primary | ICD-10-CM

## 2025-04-15 PROCEDURE — 99213 OFFICE O/P EST LOW 20 MIN: CPT | Performed by: SURGERY

## 2025-04-15 NOTE — PATIENT INSTRUCTIONS
Surgeon:         Dr. Herber Guo                                        Tel:         [phone]                                  Fax:        [phone]    Surgery/Procedure: Immediate breast reconstruction with placement of bilateral breast tissue expanders and acellular dermal matrix, possible direct to silicone implant. 2 hours, general anesthesia, outpatient. Joint with Dr. Garcia.  Please request pre-op pec block per anesthesia.        Dx Code: Z91.89     Hospital:  [facility]: [address] [place] [address]           [phone]  [facility]: [address] [place] [address]               [phone]    1. Someone will need to drive you to and from the hospital if your procedure is outpatient.    2.Do not drink alcohol or smoke 24 hours prior to your procedure.    3. Bring a picture ID and your insurance card.    4. You will be contacted by the hospital the day before to confirm the procedure time and location.     5. Do not take any herbal supplements or blood thinners at least one week before your procedure/surgery. This includes NSAID's (aspirin, baby aspirin, Motrin, Ibuprofen, Aleve, Advil, Naproxen, etc), Fish oil, vitamin E, turmeric, CoQ10, or green tea supplements, etc. *TYLENOL or acetaminophen is ok to take*    6. PRE-OPERATIVE TESTING: History and physical with medical clearance is REQUIRED within 30 days of the surgery date and is mandatory per Dr. Guo. *If this is not done, your surgery will be postponed. To avoid delays with your clearance, please ensure your PCP appointment is at least 14 days prior to your surgical date.*  MEDICAL CLEARANCE WITH DR. Freeman  CBC  CMP  EKG (within 90 days)    7. If you take Coumadin, Plavix, Xarelto, or Eliquis, please contact your prescribing physician for special instructions on how long to hold. If you take insulin, contact your primary care physician for special instructions.     8. Please inform us if you start or  change any medications at least one week before surgery (ex: blood thinners, weight loss medications, diabetic medications, herbal supplements, etc)    9. Does patient have diagnosis of sleep apnea?    [   ] Yes     [ X ]  No    Consent obtained  Photos taken on 4/15/2025

## 2025-04-15 NOTE — PROGRESS NOTES
Nereyda Ramires is a 44 year old female who presents today in follow-up.   Since her last visit, the patient was seen by Dr. Garcia and she is elected undergo bilateral prophylactic mastectomy.  She presents here today for preoperative evaluation and to review the reconstructive plan.    Physical Examination:  Breasts: General Inspection of the breasts shows well-healed circumareolar scars.  A firm subcutaneous capsule is noted bilaterally.  A paucity of breast parenchyma is noted with an approximately 1.5 to 2 cm pinch test throughout.  There are no dominant masses noted bilaterally.  There is no nipple inversion or nipple discharge noted bilaterally.  Measurements: Sternal notch to nipple 24 cm in the left and 23 cm on the right.  Base diameter is 12 cm bilaterally.  Nipple to very fold is 12 cm on the left and 13 cm on the right.     Lymph Nodes:  There is no cervical, supraclavicular, or axillary lymphadenopathy appreciated.    Assessment and Plan:  We reviewed the plan for bilateral mastectomy by Dr. Garcia and immediate implant-based reconstruction.  Given the patient's desire to be smaller than her current size, I recommended a skin sparing approach to her mastectomy.    Specifically, the nature and technique of tissue expander reconstruction was reviewed with the patient. We discussed the pre-pectoral placement of the tissue expander, use of acellular matrix, and placement of drains.  We discussed that use of acellular dermal matrix in  breast reconstruction is considered an \"off label\" use.  The expansion process, as well as the need for a secondary procedure for placement of a permanent implant, were reviewed.  Representative illustrations and photographs were demonstrated to the patient. The risks of surgery including but not limited to bleeding, infection, scarring, delayed wound healing, seroma, asymmetry, implant infection/extrusion requiring removal, injury to adjacent structures, capsular  contracture, ALCL, need for implant exchange, and need for further surgery were reviewed. We also discussed the possibility of direct to implant reconstruction based on operative findings. The patient understands that the decision on direct to implant reconstruction will be based on operative findings. She also understand the possible need for revision surgery if direct to implant reconstruction is undertaken. The expected post-operative course was discussed including the need for activity limitation, drains, and suture removal.  We discussed the recommended FDA surveillance protocol for silicone implants.    Multiple questions were answered to the patient's satisfaction. No guarantees as to outcome offered. The patient expresses understanding and wishes to proceed. A total of 20 minutes was spent reviewing the patient's history and diagnostic testing, examining the patient, reviewing reconstructive options, and coordinating the patient's care.

## 2025-04-23 ENCOUNTER — TELEPHONE (OUTPATIENT)
Dept: SURGERY | Facility: CLINIC | Age: 45
End: 2025-04-23

## 2025-04-23 DIAGNOSIS — Z80.3 FAMILY HISTORY OF BREAST CANCER: ICD-10-CM

## 2025-04-23 DIAGNOSIS — Z91.89 AT HIGH RISK FOR BREAST CANCER: Primary | ICD-10-CM

## 2025-04-23 NOTE — TELEPHONE ENCOUNTER
Calling pt in regards to scheduling surgery.  Informed pt that I have 07/10/2025 available at [facility] with Dr. Garcia/Dr. Guo.  Pt verbalized understanding and in agreement with date and location.  All questions answered.   Encouraged pt to call or [facility] message office with any other questions or concerns.

## 2025-04-25 ENCOUNTER — OFFICE VISIT (OUTPATIENT)
Age: 45
End: 2025-04-25
Attending: INTERNAL MEDICINE
Payer: COMMERCIAL

## 2025-04-25 VITALS
BODY MASS INDEX: 20.8 KG/M2 | TEMPERATURE: 98 F | RESPIRATION RATE: 16 BRPM | HEART RATE: 73 BPM | HEIGHT: 62 IN | SYSTOLIC BLOOD PRESSURE: 117 MMHG | OXYGEN SATURATION: 100 % | DIASTOLIC BLOOD PRESSURE: 78 MMHG | WEIGHT: 113 LBS

## 2025-04-25 DIAGNOSIS — Z12.31 SCREENING MAMMOGRAM FOR BREAST CANCER: ICD-10-CM

## 2025-04-25 DIAGNOSIS — R92.30 DENSE BREAST TISSUE: ICD-10-CM

## 2025-04-25 DIAGNOSIS — R92.8 ABNORMAL MRI, BREAST: ICD-10-CM

## 2025-04-25 DIAGNOSIS — Z98.82 H/O BILATERAL BREAST IMPLANTS: ICD-10-CM

## 2025-04-25 DIAGNOSIS — Z80.3 FAMILY HISTORY OF BREAST CANCER: ICD-10-CM

## 2025-04-25 DIAGNOSIS — Z80.3 FAMILY HISTORY OF BREAST CANCER IN FIRST DEGREE RELATIVE: ICD-10-CM

## 2025-04-25 DIAGNOSIS — Z91.89 AT HIGH RISK FOR BREAST CANCER: Primary | ICD-10-CM

## 2025-04-25 DIAGNOSIS — Z12.39 BREAST CANCER SCREENING, HIGH RISK PATIENT: ICD-10-CM

## 2025-04-25 DIAGNOSIS — Z12.39 BREAST CANCER SCREENING OTHER THAN MAMMOGRAM: ICD-10-CM

## 2025-04-25 NOTE — PROGRESS NOTES
ZACHERY   Nereyda Ramires is a 44 year old female here for follow up of:  Encounter Diagnoses   Name Primary?    At high risk for breast cancer Yes    Family history of breast cancer in first degree relative     Breast cancer screening, high risk patient     Screening mammogram for breast cancer     H/O bilateral breast implants     Breast cancer screening other than mammogram        Patient here for high risk breast evaluation due to her family history.  She follows at [facility] for her breast imaging, and on May 2022 she was evaluated by breast surgery.  She was having MRIs for high risk following at [facility] and had a right breast mass, which was consistent with a fibroadenoma.  The patient also has history of breast augmentation in 2008, and had met with plastic surgery for consideration of removal of the old implants and replacement with new ones.    Patient does complain of bilateral breast pain, which has remained stable.  She denied any further breast masses, nipple discharge, skin changes, or axillary masses.    Her mother had history of breast cancer at age 50, she is still alive, her maternal and had breast cancer at 49.  She states her mother had testing for BRCA and this was negative.  Her mother was tested again 4 yrs ago for new mutations and these were negative. Given that her mother was negative she was not recommended for genetic testing.     She was felt to be increased risk of breast cancer based on her family history, she did have a benign breast biopsy.  At the time of visit with Dr. Garvey, patient was interested in risk reducing mastectomies.  It was discussed with her at the time her risk estimates and it was recommended against prophylactic mastectomies.  It was discussed with the patient that risk-reducing surgery was indicated for patients with high risk genetic mutations, and for some patients with LCIS and significant family history.  Patient was to return to  clinic with annual mammogram, and breast exam.  She also was to have annual breast MRIs.  Was also previously evaluated at [facility] for the same.  This was back in 2019 when she initially presented that she was seen at [facility] by Dr. Ramires.  At that time her Salome score was 0.9% 5-year risk, therefore, she did not meet criteria for chemoprevention.  Initial presentation, her Tyer David model lifetime risk was 28%, 10-year risk was 3.1%, per Salome model lifetime risk was 19.4% and 5-year risk was 0.9%.    She is on a high risk surveillance protocol given that her lifetime risk of breast cancer is greater than or equal to 20%, and is already undergoing alternating yearly mammogram with yearly MRI.    She had MRI end of March and was recommend for US which was negative.     She is scheduled for B mastectomies and reconstructions in July 2025.    Breast Cancer Risk factors:    Age at menarche:  13  Menstrual Status: pre menopausal.    G 4 P 3  Age of first live birth:  29  Age of last live birth:  35     History of breastfeeding:  Yes.   Lifetime duration:   1 month        Hormonal contraceptive use:  Yes.  Lifetime duration:   6 years.  Has Mirena IUD will be 3 yrs January or February of 2025.  No periods.       Infertility treatments:  No                                                                                                                Previous benign biopsies:  Yes, benign  Prior breast surgery:   Yes, breast augmentation/implants 2007    Family Cancer History:  Breast Cancer-related family history includes Breast Cancer (age of onset: 46) in her maternal aunt; Breast Cancer (age of onset: 49) in her mother; Breast Cancer (age of onset: 50) in her maternal aunt and maternal aunt.    Ashkenazi Mandaen Ancestry:   No                 Cancer Genetic Testing:  No, as above, mother tested negative x2.      Review of Systems:   Review of Systems   Constitutional:  Positive for  fatigue. Negative for appetite change and unexpected weight change.   Respiratory:  Negative for cough and shortness of breath.    Cardiovascular:  Negative for chest pain.   Gastrointestinal:  Negative for abdominal pain.   Musculoskeletal:  Negative for arthralgias, back pain and neck pain.   Neurological:  Positive for headaches (chronic migraines, states these have improved on medications as needed). Negative for dizziness.   Hematological:  Negative for adenopathy.        Beta thalassemia trait states chronic anemia.   Psychiatric/Behavioral:  Positive for sleep disturbance.            Current Outpatient Medications   Medication Sig Dispense Refill    QULIPTA 10 MG Oral Tab TAKE 10 MG BY MOUTH DAILY. 90 tablet 1    EPINEPHrine (EPIPEN 2-MARGOTH) 0.3 MG/0.3ML Injection Solution Auto-injector Inject 0.3 mL (1 each total) as directed as needed. 1 each 1    pantoprazole (PROTONIX) 40 MG Oral Tab EC Take 1 tablet (40 mg total) by mouth every morning before breakfast. 90 tablet 1    NP THYROID 30 MG Oral Tab TAKE 1 TABLET BY MOUTH EVERY DAY 90 tablet 0    UBRELVY 100 MG Oral Tab        Allergies:   Allergies   Allergen Reactions    Shellfish ANAPHYLAXIS       Past Medical History:    Beta thalassemia minor    Migraine     Past Surgical History:   Procedure Laterality Date    Abdominoplasty  2016    Dr. Remi Curiel          , ,     Hernia surgery  2016    Needle biopsy right      Other surgical history  2008    bilateral breast augmentation, implants Dr Curiel     Social History     Socioeconomic History    Marital status:    Tobacco Use    Smoking status: Never    Smokeless tobacco: Never   Vaping Use    Vaping status: Never Used   Substance and Sexual Activity    Alcohol use: Yes     Alcohol/week: 3.0 standard drinks of alcohol     Types: 3 Standard drinks or equivalent per week    Drug use: Never       Family History   Problem Relation Age of Onset    Breast Cancer Mother 49        BRCA  negative    Brain Cancer Maternal Grandfather     Breast Cancer Maternal Aunt 46    Breast Cancer Maternal Aunt 50    Breast Cancer Maternal Aunt 50    Prostate Cancer Maternal Uncle 52         PHYSICAL EXAM:    /78 (BP Location: Left arm, Patient Position: Sitting, Cuff Size: adult)   Pulse 73   Temp 98.4 °F (36.9 °C) (Oral)   Resp 16   Ht 1.575 m (5' 2\")   Wt 51.3 kg (113 lb)   SpO2 100%   BMI 20.67 kg/m²   Wt Readings from Last 6 Encounters:   04/25/25 51.3 kg (113 lb)   02/26/25 53.1 kg (117 lb)   01/14/25 50.8 kg (112 lb)   10/09/24 53.2 kg (117 lb 3.2 oz)   04/11/24 52.7 kg (116 lb 3.2 oz)   04/08/24 52.2 kg (115 lb)   Physical Exam  General: Patient is alert, not in acute distress.  HEENT: EOMs intact. PERRL.  Neck: No JVD. No palpable lymphadenopathy. Neck is supple.  Chest: Clear to auscultation.  Breasts: B implants with periareolar scars.  No masses. Scar tissue below implant prominent at the UOQ of the R breast.   Heart: Regular rate and rhythm.   Abdomen: Soft, non tender with good bowel sounds.  Extremities: No edema.  Neurological: Grossly intact.   Lymphatics: There is no palpable lymphadenopathy throughout in the cervical, supraclavicular, axillary, or inguinal regions.  Psych/Depression: nl        ASSESSMENT/PLAN:     1. At high risk for breast cancer    2. Family history of breast cancer in first degree relative    3. Breast cancer screening, high risk patient    4. Screening mammogram for breast cancer    5. H/O bilateral breast implants    6. Breast cancer screening other than mammogram        Patient initially presented 2019.  When she initially presented that she was seen at [facility] by Dr. Ramires.  At that time her Slaome score was 0.9% 5-year risk, therefore, she did not meet criteria for chemoprevention.  Initial presentation, her Tyer [place] model lifetime risk was 28%, 10-year risk was 3.1%, per Salome model lifetime risk was 19.4% and 5-year risk was 0.9%.    She  is on a high risk surveillance protocol given that her lifetime risk of breast cancer is greater than or equal to 20%, and is already undergoing alternating yearly mammogram with yearly MRI.    Patient had mammogram on 10/28/2024 BI-RADS 2    MRI of the breast on 3/20/2025 which had shown a 1.3 cm enhancing mass.  Targeted ultrasound of the right breast upper inner portion was recommended.  This was on 4/7/2025 which did not show any correlating findings.     Patient is scheduled for bilateral nipple sparing mastectomies with immediate reconstruction 7/10/2025.  She would not need further imaging after this.  She will not need any further follow-up with myself after bilateral mastectomies as this will complete her prophylaxis.       MDM low risk      No orders of the defined types were placed in this encounter.      Results From Past 48 Hours:  No results found for this or any previous visit (from the past 48 hours).    Imaging & Referrals:  None   No orders of the defined types were placed in this encounter.    PROCEDURE: MRI BREAST (W+WO) W/CAD BILAT (CPT=77049)      COMPARISON: [facility], [facility] RIVERA 2D+3D SCREENING [facility] BILAT ([id number]), 10/28/2024, 3:27 PM.  [facility], MRI BREAST (W+WO) W/CAD BILAT (CPT=77049), 3/11/2024, 1:17 PM.  External Exams, MRI BREAST (ROUTINE),   BILATERAL (CPT=77059), 2/24/2022, 1:17 PM.  External Exams, MRI BREAST BIOPSY W/CLIP 1 SITE RIGHT (CPT=19085), 1/07/2020, 9:47 AM.  External Exams, MRI BREAST (W+WO) W/CAD BILAT (CPT=77049), 12/09/2019, 9:26 AM.      INDICATIONS: Ms. Ramires is a 44 year old female presenting for high risk MRI.      BREAST COMPOSITION: Category D - The breasts are extremely dense, which lowers the sensitivity of mammography.      TECHNIQUE: Breast MRI was performed using dynamic intravenous gadolinium infusion, thin sections, and a dedicated breast coil.  Contrast enhancement analysis was assisted by computer-aided  detection.       ENHANCEMENT PATTERN:   Moderate, with 50-75% of glandular tissue demonstrating enhancement.     FINDINGS: In the right upper inner breast, mid depth, there is a progressively enhancing mass with homogeneous enhancement measuring 1.3 x 0.9 x 0.3 cm.  This may have been present on prior examination from 03/2024 although this is uncertain due to   differences in technique.      In the left breast, no suspicious enhancing masses or non-mass enhancement are identified.               Impression  CONCLUSION:    1. 1.3 cm enhancing mass with progressive kinetics in the right upper inner breast. A targeted right breast US is recommended for further evaluation. If no sonographic correlate is identified, a short term follow-up MRI is recommended in 6 months in   order to ensure stability of these findings.      2. No MRI evidence of malignancy of the left breast.      BI-RADS CATEGORY:   DIAGNOSTIC CATEGORY 0 - INCOMPLETE ASSESSMENT: NEED ADDITIONAL IMAGING EVALUATION.       RECOMMENDATIONS:   ULTRASOUND: RIGHT BREAST  --We will call the patient back for an ultrasound and provide an additional report.       PLEASE NOTE: A NORMAL MRI DOES NOT EXCLUDE THE POSSIBILITY OF BREAST CANCER.  A CLINICALLY SUSPICIOUS PALPABLE LUMP SHOULD BE BIOPSIED.                         Dictated by (CST): Sharmin Rushing MD on 3/31/2025 at 1:10 PM       Finalized by (CST): Sharmin Rushing MD on 3/31/2025 at 1:35 PM         PROCEDURE: US BREAST RIGHT LIMITED (CPT=76642)     COMPARISON: External Exams, US BREAST RIGHT LIMITED (CPT=76642), 3/04/2022, 2:18 PM.  External Exams, US BREAST LEFT LIMITED  (CPT=76642), 2/22/2021, 2:27 PM.  External Exams, US BREAST RIGHT LIMITED (CPT=76642), 1/06/2020, 11:12 AM.  External Exams, US  BREAST RIGHT LIMITED (CPT=76642), 11/25/2019, 11:58 AM.     INDICATIONS: Abnormal MRI, breast  Family history of breast cancer in mother at the age of 49, and 3 maternal aunts at the age of (46, 50, 50). Recent breast  MRI dated 3/28/2025showed a 13mm enhancing mass in right upper inner quadrant for which MRI directed ultrasound was recommended.  TECHNIQUE: Breast ultrasound was performed with evaluation only on specific areas of concern (right upper inner quadrant).       FINDINGS: Targeted MRI directed ultrasound was performed in the right breast upper inner quadrant, 5 cm from the nipple. No suspicious cystic or solid mass is identified in the targeted ultrasound. Dense breast tissue abutting the silicone implant is  noted.                    Impression   CONCLUSION:   13 mm enhancing mass on MRI in the right upper inner breast without sonographic correlate needs short term follow up MRI in 6 months to confirm stability.     BI-RADS CATEGORY:    DIAGNOSTIC CATEGORY 3 - PROBABLY BENIGN FINDING.       RECOMMENDATIONS:  SHORT TERM FOLLOW-UP BREAST MRI BILATERAL BREASTS IN 6 MONTHS.         Your patient's answers to the health and family history questions collected during this mammogram indicate a potentially increased risk for breast cancer. It is recommended that this patient be evaluated in our Cancer Risk Assessment Clinic to determine  eligibility for additional breast cancer screening, risk reduction strategies and/or genetic testing. Providers are encouraged to contact our breast navigator, Patty Gan, at [phone] with any questions or for guidance regarding this  recommendation.           Dictated by (CST): Carmelina Turpin MD on 4/07/2025 at 11:55 AM      Finalized by (CST): Carmelina Turpin MD on 4/07/2025 at 12:14 PM

## 2025-05-07 ENCOUNTER — TELEPHONE (OUTPATIENT)
Dept: SURGERY | Facility: CLINIC | Age: 45
End: 2025-05-07

## 2025-05-07 NOTE — TELEPHONE ENCOUNTER
Called pt, she answered phone, then call hung up, tried calling 3 times and goes to . Wanted to give her the   forms department #[phone]  to send her FMLA or if she has questions regarding FMLA to call forms department we don't handle FMLA any more.  Fax [phone]

## 2025-05-14 ENCOUNTER — PATIENT MESSAGE (OUTPATIENT)
Dept: SURGERY | Facility: CLINIC | Age: 45
End: 2025-05-14

## 2025-05-27 ENCOUNTER — TELEPHONE (OUTPATIENT)
Facility: CLINIC | Age: 45
End: 2025-05-27

## 2025-06-02 NOTE — TELEPHONE ENCOUNTER
Patient called back gathered the below;    Type of Leave: short term disability   Reason for Leave: Procedure on 07/10/2025  Start date of leave: 07/10/2025  End date of leave: 4-6 wks recovery   How many flare ups per month/length?:  How many appts per month/length?:   Was Fee and Turnaround info Given?: Yes   Adv'd processing agent

## 2025-06-03 NOTE — TELEPHONE ENCOUNTER
Dr. Garcia/ Paty MACK      Please sign off on form if you agree to: Disability  Start date of leave: 07/10/2025  End date of leave: 08/24/25   RTW: 08/25/25 Full duty   (place your signature on the first page only)    -From your Inbasket, Highlight the patient and click Chart   -Double click the 05/27/25 Forms Completion telephone encounter  -Scroll down to the Media section   -Click the blue Hyperlink: Disab Dr Garica 06/03/25  -Click Acknowledge located in the top right ribbon/menu   -Drag the mouse into the blank space of the document and a + sign will appear. Left click to   electronically sign the document.     Thank you,    Annabelle MOHAN

## 2025-06-25 ENCOUNTER — TELEPHONE (OUTPATIENT)
Dept: SURGERY | Facility: CLINIC | Age: 45
End: 2025-06-25

## 2025-06-30 ENCOUNTER — TELEPHONE (OUTPATIENT)
Dept: SURGERY | Facility: CLINIC | Age: 45
End: 2025-06-30

## 2025-06-30 NOTE — TELEPHONE ENCOUNTER
Contacted patient's PCP office to request office note for patient's surgical clearance be completed.

## 2025-06-30 NOTE — TELEPHONE ENCOUNTER
Contacted patient to offer preoperative education class. Patient agreed to 7/7 at 11 am in [place].

## 2025-07-07 ENCOUNTER — NURSE ONLY (OUTPATIENT)
Facility: CLINIC | Age: 45
End: 2025-07-07

## 2025-07-07 NOTE — PROGRESS NOTES
Patient presented to the office for nurse visit for Pre-Op Plastic Surgery education session prior to their upcoming surgery.   Tissue Expander presentation was shown. Ample time was spent with patient discussing the nature of the procedure as well was the expected tissue expansion process, timeframe, and second stage reconstruction options. The patient was also educated on activity restrictions, drain care, post op appts,  and post op medications. Educational illustrations and photographs were reviewed with the patient.   The reasons to call our office with post operative complications was discussed and included, but are not limited to, infection, swelling, drain complications, excessive bruising or bleeding.   Multiple questions were answered to the patient's satisfaction.The patient was provided with a copy of the presentation as well as post operative supplies.     JANE Pearson

## 2025-07-08 NOTE — DISCHARGE INSTRUCTIONS
HOME INSTRUCTIONS    Plastic Surgery Home Care Instructions      We hope you were pleased with your care at [facility].  We wish you the best outcome and overall experience with your operation.  These instructions will help to minimize pain, optimize healing, and improve the likelihood of a successful result.    What To Expect  There will be some spotting of the incision lines for the next few days.  Your breasts will be swollen and might feel congested for the next 1-2 weeks  Please DO NOT apply heat or ice to the affected area  Temporary areas of numbness are typical in the early weeks following a breast procedure.  Normalization of sensation can typically take up to several months following the operation.    Bandages (Dressing)  Keep dressings clean and dry  Do not remove your bra unless for hygiene purposes - compression is key - especially at night. You can change to a supportive sports bra or camilsole if this provides good compression and you are more comfortable in that rather than the surgical bra. No underwire.   You are to wear your bra 24/7 for 6 weeks post-operatively.   Reinforce the dressing with insertion of additional padding as needed - this is not required - for your comfort only  You can change the drain dressings if you need to (if they get wet). You will be given extra supplies from the hospital.     Drain Care  Proper drain care is an important part of your home care and will help to prevent fluid collection, minimize the risk for infection, and increase the success of your breast reconstruction.  Empty your drains at 8 am and 8 pm each day  DO NOT GET DRAIN SITES WET  Record each drain separately and use the drain sheet given to you to record the drainage. Follow the instructions on the drain sheet.  Wash your hands before and after emptying your drains  Bring drain sheet with you to your first office visit    Bathing/Showers  You will needs to sponge bathe until your drains are  removed. You may shower, but only from the waist down. Before shower, take out all dressings from bra. Reinforce drain sites with additional waterproof dressings if needed. These will be given to you by the hospital. If some water gets underneath the dressing during the shower, just replace with a new dry waterproof dressing.   DO NOT GET DRAIN SITES WET  No baths, swimming, or hot tubs until you receive medical permission    Pain Medication: Norco  Take one or two tablets every six hours as needed for pain.  Do not take narcotics, if you do not have pain.  Keep stools soft.  Take a stool softener (Metamucil, Milk of Magnesia, Colace).  Eating fruit will also help to prevent constipation    Antibiotics: Keflex 4x/day until your drains come out  Antibiotics will help minimize the risk of a wound infection  Please note the refills on this prescription. If your drains are kept in after you finish the first course of the antibiotic, you need to continue refilling this until your drains are completely removed.   Fill the prescription as directed   Follow the instructions as written on the bottle's label  Call our office if you experience nausea, rash, or other symptoms which might be a possible side effect.    Over-The-Counter Medication  Non-prescription anti-inflammatory medications can also help to ease the pain.  You can take Aleve or ibuprofen starting 72 hours after surgery  Take as directed on the bottle  Drink a full glass of water with the medication    Home Medication  Resume your home medications as instructed  Do not resume herbal medications for two weeks    Diet  Resume your normal diet    Activity  No strenuous activity or heavy lifting (no lifting over 10 pounds)  No activities that involve your pectoralis muscles (no planks, push-ups, etc)  You can go up and down the stairs as tolerated.   You cannot return to work, if your work requires strenuous activity.  You cannot return to physical exercise,  sports, or gym workouts until you are allowed to participate in strenuous activity.    Driving  Do not drive, if you are taking pain medication.    Return to Work or School  You can return to work when you are not taking pain medication, if your work does not involve strenuous activity.  Contact the office, if you need a medical note.     Follow-up Appointment   Our office will call you to set up a time  Call the office for an appointment in two days if you cannot remember the appointment details.    Verify your appointment date, day, time, and location.  At your 1st postoperative office visit:  Your drains will be examined, wounds will be evaluated, healing assessed, and any additional concerns and instructions will be discussed.    Questions or Concerns  Call Dr. Guo's office if you experience sudden swelling of the breast or purple/red/black discoloration of the breast.     Nereyda   Thank you for coming to [facility] for your operation.  The nurses and the anesthesiologist try very hard to make sure you receive the best care possible.  Your trust in them is greatly appreciated.    Thanks so much,  Dr. Brant Reardon, FNP-C  Lesly Ashby, FNP-BC     [facility] HOME CARE INSTRUCTIONS: POST-OP ANESTHESIA  The medication that you received for sedation or general anesthesia can last up to 24 hours. Your judgment and reflexes may be altered, even if you feel like your normal self.      We Recommend:   Do not drive any motor vehicle or bicycle   Avoid mowing the lawn, playing sports, or working with power tools/applicances (power saws, electric knives or mixers)   That you have someone stay with you on your first night home   Do not drink alcohol or take sleeping pills or tranquilizers   Do not sign legal documents within 24 hours of your procedure   If you had a nerve block for your surgery, take extra care not to put any pressure on your arm or hand for 24 hours    It is normal:  For you to have a sore  throat if you had a breathing tube during surgery (while you were asleep!). The sore throat should get better within 48 hours. You can gargle with warm salt water (1/2 tsp in 4 oz warm water) or use a throat lozenge for comfort  To feel muscle aches or soreness especially in the abdomen, chest or neck. The achy feeling should go away in the next 24 hours  To feel weak, sleepy or \"wiped out\". Your should start feeling better in the next 24 hours.   To experience mild discomforts such as sore lip or tongue, headache, cramps, gas pains or a bloated feeling in your abdomen.   To experience mild back pain or soreness for a day or two if you had spinal or epidural anesthesia.   If you had laparoscopic surgery, to feel shoulder pain or discomfort on the day of surgery.   For some patients to have nausea after surgery/anesthesia    If you feel nausea or experience vomiting:   Try to move around less.   Eat less than usual or drink only liquids until the next morning   Nausea should resolve in about 24 hours    If you have a problem when you are at home:    Call your surgeons office   Discharge Instructions: After Your Surgery  You’ve just had surgery. During surgery, you were given medicine called anesthesia to keep you relaxed and free of pain. After surgery, you may have some pain or nausea. This is common. Here are some tips for feeling better and getting well after surgery.   Going home  Your healthcare provider will show you how to take care of yourself when you go home. They'll also answer your questions. Have an adult family member or friend drive you home. For the first 24 hours after your surgery:   Don't drive or use heavy equipment.  Don't make important decisions or sign legal papers.  Take medicines as directed.  Don't drink alcohol.  Have someone stay with you, if needed. They can watch for problems and help keep you safe.  Be sure to go to all follow-up visits with your healthcare provider. And rest after  your surgery for as long as your provider tells you to.   Coping with pain  If you have pain after surgery, pain medicine will help you feel better. Take it as directed, before pain becomes severe. Also, ask your healthcare provider or pharmacist about other ways to control pain. This might be with heat, ice, or relaxation. And follow any other instructions your surgeon or nurse gives you.      Stay on schedule with your medicine.     Tips for taking pain medicine  To get the best relief possible, remember these points:   Pain medicines can upset your stomach. Taking them with a little food may help.  Most pain relievers taken by mouth need at least 20 to 30 minutes to start to work.  Don't wait till your pain becomes severe before you take your medicine. Try to time your medicine so that you can take it before starting an activity. This might be before you get dressed, go for a walk, or sit down for dinner.  Constipation is a common side effect of some pain medicines. Call your healthcare provider before taking any medicines, such as laxatives or stool softeners, to help ease constipation. Also ask if you should skip any foods. Drinking lots of fluids and eating foods, such as fruits and vegetables, that are high in fiber can also help. Remember, don't take laxatives unless your surgeon has prescribed them.  Drinking alcohol and taking pain medicine can cause dizziness and slow your breathing. It can even be deadly. Don't drink alcohol while taking pain medicine.  Pain medicine can make you react more slowly to things. Don't drive or run machinery while taking pain medicine.  Your healthcare provider may tell you to take acetaminophen to help ease your pain. Ask them how much you're supposed to take each day. Acetaminophen or other pain relievers may interact with your prescription medicines or other over-the-counter (OTC) medicines. Some prescription medicines have acetaminophen and other ingredients in them.  Using both prescription and OTC acetaminophen for pain can cause you to accidentally overdose. Read the labels on your OTC medicines with care. This will help you to clearly know the list of ingredients, how much to take, and any warnings. It may also help you not take too much acetaminophen. If you have questions or don't understand the information, ask your pharmacist or healthcare provider to explain it to you before you take the OTC medicine.   Managing nausea  Some people have an upset stomach (nausea) after surgery. This is often because of anesthesia, pain, or pain medicine, less movement of food in the stomach, or the stress of surgery. These tips will help you handle nausea and eat healthy foods as you get better. If you were on a special food plan before surgery, ask your healthcare provider if you should follow it while you get better. Check with your provider on how your eating should progress. It may depend on the surgery you had. These general tips may help:   Don't push yourself to eat. Your body will tell you when to eat and how much.  Start off with clear liquids and soup. They're easier to digest.  Next try semi-solid foods as you feel ready. These include mashed potatoes, applesauce, and gelatin.  Slowly move to solid foods. Don’t eat fatty, rich, or spicy foods at first.  Don't force yourself to have 3 large meals a day. Instead eat smaller amounts more often.  Take pain medicines with a small amount of solid food, such as crackers or toast. This helps prevent nausea.  When to call your healthcare provider  Call your healthcare provider right away if you have any of these:   You still have too much pain, or the pain gets worse, after taking the medicine. The medicine may not be strong enough. Or there may be a complication from the surgery.  You feel too sleepy, dizzy, or groggy. The medicine may be too strong.  Side effects, such as nausea or vomiting. Your healthcare provider may advise taking  other medicines to treat these or may change your treatment plan..  Skin changes, such as rash, itching, or hives. This may mean you have an allergic reaction. Your provider may advise taking other medicines.  The incision looks different (for instance, part of it opens up).  Bleeding or fluid leaking from the incision site, and you weren't told to expect that.  Fever of 100.4°F (38°C) or higher, or as directed by your healthcare provider.  Call 911  Call 911 right away if you have:   Trouble breathing  Facial swelling    If you have obstructive sleep apnea   You were given anesthesia medicine during surgery to keep you comfortable and free of pain. After surgery, you may have more apnea spells because of this medicine and other medicines you were given. The spells may last longer than normal.    At home:  Keep using the continuous positive airway pressure (CPAP) device when you sleep. Unless your healthcare provider tells you not to, use it when you sleep, day or night. CPAP is a common device used to treat obstructive sleep apnea.  Talk with your provider before taking any pain medicine, muscle relaxants, or sedatives. Your provider will tell you about the possible dangers of taking these medicines.  Contact your provider if your sleeping changes a lot even when taking medicines as directed.  [email or web address] last reviewed this educational content on 4/1/2024  This information is for informational purposes only. This is not intended to be a substitute for professional medical advice, diagnosis, or treatment. Always seek the advice and follow the directions from your physician or other qualified health care provider.  © 7383-6850 [employer]. All rights reserved. This information is not intended as a substitute for professional medical care. Always follow your healthcare professional's instructions.

## 2025-07-10 ENCOUNTER — ANESTHESIA EVENT (OUTPATIENT)
Dept: SURGERY | Facility: HOSPITAL | Age: 45
End: 2025-07-10
Payer: COMMERCIAL

## 2025-07-10 ENCOUNTER — HOSPITAL ENCOUNTER (OUTPATIENT)
Facility: HOSPITAL | Age: 45
Setting detail: HOSPITAL OUTPATIENT SURGERY
Discharge: HOME | End: 2025-07-10
Attending: SURGERY | Admitting: SURGERY
Payer: COMMERCIAL

## 2025-07-10 ENCOUNTER — ANESTHESIA (OUTPATIENT)
Dept: SURGERY | Facility: HOSPITAL | Age: 45
End: 2025-07-10
Payer: COMMERCIAL

## 2025-07-10 VITALS
HEIGHT: 62 IN | HEART RATE: 76 BPM | SYSTOLIC BLOOD PRESSURE: 107 MMHG | DIASTOLIC BLOOD PRESSURE: 51 MMHG | WEIGHT: 113 LBS | BODY MASS INDEX: 20.8 KG/M2 | TEMPERATURE: 98 F | RESPIRATION RATE: 20 BRPM | OXYGEN SATURATION: 99 %

## 2025-07-10 DIAGNOSIS — Z91.89 AT HIGH RISK FOR BREAST CANCER: Primary | ICD-10-CM

## 2025-07-10 DIAGNOSIS — Z80.3 FAMILY HISTORY OF BREAST CANCER: ICD-10-CM

## 2025-07-10 LAB — B-HCG UR QL: NEGATIVE

## 2025-07-10 PROCEDURE — 88307 TISSUE EXAM BY PATHOLOGIST: CPT | Performed by: SURGERY

## 2025-07-10 PROCEDURE — 88305 TISSUE EXAM BY PATHOLOGIST: CPT | Performed by: SURGERY

## 2025-07-10 PROCEDURE — 88341 IMHCHEM/IMCYTCHM EA ADD ANTB: CPT | Performed by: SURGERY

## 2025-07-10 PROCEDURE — 88300 SURGICAL PATH GROSS: CPT | Performed by: SURGERY

## 2025-07-10 PROCEDURE — 88342 IMHCHEM/IMCYTCHM 1ST ANTB: CPT | Performed by: SURGERY

## 2025-07-10 PROCEDURE — 76942 ECHO GUIDE FOR BIOPSY: CPT | Performed by: ANESTHESIOLOGY

## 2025-07-10 PROCEDURE — 81025 URINE PREGNANCY TEST: CPT

## 2025-07-10 DEVICE — NATRELLE INSPIRA SCF 415CC FULL PROFILE  SMOOTH ROUND SILICONE
Type: IMPLANTABLE DEVICE | Status: FUNCTIONAL
Brand: NATRELLE INSPIRA COHESIVE BREAST IMPLANTS

## 2025-07-10 DEVICE — GRAFT HUM TISS THK2-2.8MM THCK M PERF CNTOUR 9.6 X 19.3 CM PC SZ 132 CM: Type: IMPLANTABLE DEVICE | Status: FUNCTIONAL

## 2025-07-10 RX ORDER — MORPHINE SULFATE 10 MG/ML
6 INJECTION, SOLUTION INTRAMUSCULAR; INTRAVENOUS EVERY 10 MIN PRN
Status: DISCONTINUED | OUTPATIENT
Start: 2025-07-10 | End: 2025-07-10

## 2025-07-10 RX ORDER — ROCURONIUM BROMIDE 10 MG/ML
INJECTION, SOLUTION INTRAVENOUS AS NEEDED
Status: DISCONTINUED | OUTPATIENT
Start: 2025-07-10 | End: 2025-07-10 | Stop reason: SURG

## 2025-07-10 RX ORDER — SODIUM CHLORIDE, SODIUM LACTATE, POTASSIUM CHLORIDE, CALCIUM CHLORIDE 600; 310; 30; 20 MG/100ML; MG/100ML; MG/100ML; MG/100ML
INJECTION, SOLUTION INTRAVENOUS CONTINUOUS
Status: DISCONTINUED | OUTPATIENT
Start: 2025-07-10 | End: 2025-07-10

## 2025-07-10 RX ORDER — ACETAMINOPHEN 650 MG
TABLET, EXTENDED RELEASE ORAL AS NEEDED
Status: DISCONTINUED | OUTPATIENT
Start: 2025-07-10 | End: 2025-07-10 | Stop reason: HOSPADM

## 2025-07-10 RX ORDER — ROPIVACAINE HYDROCHLORIDE 2 MG/ML
INJECTION, SOLUTION EPIDURAL; INFILTRATION; PERINEURAL AS NEEDED
Status: DISCONTINUED | OUTPATIENT
Start: 2025-07-10 | End: 2025-07-10 | Stop reason: SURG

## 2025-07-10 RX ORDER — MIDAZOLAM HYDROCHLORIDE 1 MG/ML
INJECTION INTRAMUSCULAR; INTRAVENOUS AS NEEDED
Status: DISCONTINUED | OUTPATIENT
Start: 2025-07-10 | End: 2025-07-10 | Stop reason: SURG

## 2025-07-10 RX ORDER — HYDROMORPHONE HYDROCHLORIDE 1 MG/ML
0.6 INJECTION, SOLUTION INTRAMUSCULAR; INTRAVENOUS; SUBCUTANEOUS EVERY 5 MIN PRN
Status: DISCONTINUED | OUTPATIENT
Start: 2025-07-10 | End: 2025-07-10

## 2025-07-10 RX ORDER — NALOXONE HYDROCHLORIDE 0.4 MG/ML
0.08 INJECTION, SOLUTION INTRAMUSCULAR; INTRAVENOUS; SUBCUTANEOUS AS NEEDED
Status: DISCONTINUED | OUTPATIENT
Start: 2025-07-10 | End: 2025-07-10

## 2025-07-10 RX ORDER — LIDOCAINE HYDROCHLORIDE 10 MG/ML
INJECTION, SOLUTION EPIDURAL; INFILTRATION; INTRACAUDAL; PERINEURAL AS NEEDED
Status: DISCONTINUED | OUTPATIENT
Start: 2025-07-10 | End: 2025-07-10 | Stop reason: SURG

## 2025-07-10 RX ORDER — HYDROMORPHONE HYDROCHLORIDE 1 MG/ML
0.2 INJECTION, SOLUTION INTRAMUSCULAR; INTRAVENOUS; SUBCUTANEOUS EVERY 5 MIN PRN
Status: DISCONTINUED | OUTPATIENT
Start: 2025-07-10 | End: 2025-07-10

## 2025-07-10 RX ORDER — HYDROCODONE BITARTRATE AND ACETAMINOPHEN 5; 325 MG/1; MG/1
1 TABLET ORAL ONCE
Refills: 0 | Status: COMPLETED | OUTPATIENT
Start: 2025-07-10 | End: 2025-07-10

## 2025-07-10 RX ORDER — ONDANSETRON 4 MG/1
4 TABLET, FILM COATED ORAL EVERY 8 HOURS PRN
Qty: 12 TABLET | Refills: 0 | Status: SHIPPED | OUTPATIENT
Start: 2025-07-10 | End: 2025-07-16 | Stop reason: ALTCHOICE

## 2025-07-10 RX ORDER — ONDANSETRON 2 MG/ML
4 INJECTION INTRAMUSCULAR; INTRAVENOUS ONCE
Status: COMPLETED | OUTPATIENT
Start: 2025-07-10 | End: 2025-07-10

## 2025-07-10 RX ORDER — HYDROCODONE BITARTRATE AND ACETAMINOPHEN 5; 325 MG/1; MG/1
1-2 TABLET ORAL EVERY 4 HOURS PRN
Qty: 40 TABLET | Refills: 0 | Status: SHIPPED | OUTPATIENT
Start: 2025-07-10 | End: 2025-07-16 | Stop reason: ALTCHOICE

## 2025-07-10 RX ORDER — ACETAMINOPHEN 500 MG
1000 TABLET ORAL ONCE
Status: COMPLETED | OUTPATIENT
Start: 2025-07-10 | End: 2025-07-10

## 2025-07-10 RX ORDER — MORPHINE SULFATE 4 MG/ML
4 INJECTION, SOLUTION INTRAMUSCULAR; INTRAVENOUS EVERY 10 MIN PRN
Status: DISCONTINUED | OUTPATIENT
Start: 2025-07-10 | End: 2025-07-10

## 2025-07-10 RX ORDER — HYDROMORPHONE HYDROCHLORIDE 1 MG/ML
0.4 INJECTION, SOLUTION INTRAMUSCULAR; INTRAVENOUS; SUBCUTANEOUS EVERY 5 MIN PRN
Status: DISCONTINUED | OUTPATIENT
Start: 2025-07-10 | End: 2025-07-10

## 2025-07-10 RX ORDER — CEPHALEXIN 500 MG/1
500 CAPSULE ORAL 4 TIMES DAILY
Qty: 40 CAPSULE | Refills: 1 | Status: SHIPPED | OUTPATIENT
Start: 2025-07-10

## 2025-07-10 RX ORDER — DOCUSATE SODIUM 100 MG/1
100 CAPSULE, LIQUID FILLED ORAL 2 TIMES DAILY
Qty: 30 CAPSULE | Refills: 1 | Status: SHIPPED | OUTPATIENT
Start: 2025-07-10

## 2025-07-10 RX ORDER — DEXAMETHASONE SODIUM PHOSPHATE 4 MG/ML
VIAL (ML) INJECTION AS NEEDED
Status: DISCONTINUED | OUTPATIENT
Start: 2025-07-10 | End: 2025-07-10 | Stop reason: SURG

## 2025-07-10 RX ORDER — ONDANSETRON 2 MG/ML
INJECTION INTRAMUSCULAR; INTRAVENOUS AS NEEDED
Status: DISCONTINUED | OUTPATIENT
Start: 2025-07-10 | End: 2025-07-10 | Stop reason: SURG

## 2025-07-10 RX ORDER — MORPHINE SULFATE 4 MG/ML
2 INJECTION, SOLUTION INTRAMUSCULAR; INTRAVENOUS EVERY 10 MIN PRN
Status: DISCONTINUED | OUTPATIENT
Start: 2025-07-10 | End: 2025-07-10

## 2025-07-10 RX ADMIN — LIDOCAINE HYDROCHLORIDE 50 MG: 10 INJECTION, SOLUTION EPIDURAL; INFILTRATION; INTRACAUDAL; PERINEURAL at 15:00:00

## 2025-07-10 RX ADMIN — ROCURONIUM BROMIDE 20 MG: 10 INJECTION, SOLUTION INTRAVENOUS at 16:24:00

## 2025-07-10 RX ADMIN — ROCURONIUM BROMIDE 50 MG: 10 INJECTION, SOLUTION INTRAVENOUS at 15:01:00

## 2025-07-10 RX ADMIN — MIDAZOLAM HYDROCHLORIDE 2 MG: 1 INJECTION INTRAMUSCULAR; INTRAVENOUS at 14:54:00

## 2025-07-10 RX ADMIN — SODIUM CHLORIDE, SODIUM LACTATE, POTASSIUM CHLORIDE, CALCIUM CHLORIDE: 600; 310; 30; 20 INJECTION, SOLUTION INTRAVENOUS at 18:18:00

## 2025-07-10 RX ADMIN — ROPIVACAINE HYDROCHLORIDE 30 ML: 2 INJECTION, SOLUTION EPIDURAL; INFILTRATION; PERINEURAL at 15:15:00

## 2025-07-10 RX ADMIN — ONDANSETRON 4 MG: 2 INJECTION INTRAMUSCULAR; INTRAVENOUS at 18:18:00

## 2025-07-10 RX ADMIN — DEXAMETHASONE SODIUM PHOSPHATE 8 MG: 4 MG/ML VIAL (ML) INJECTION at 15:09:00

## 2025-07-10 RX ADMIN — SODIUM CHLORIDE, SODIUM LACTATE, POTASSIUM CHLORIDE, CALCIUM CHLORIDE: 600; 310; 30; 20 INJECTION, SOLUTION INTRAVENOUS at 15:01:00

## 2025-07-10 RX ADMIN — ROPIVACAINE HYDROCHLORIDE 30 ML: 2 INJECTION, SOLUTION EPIDURAL; INFILTRATION; PERINEURAL at 15:16:00

## 2025-07-10 RX ADMIN — MIDAZOLAM HYDROCHLORIDE 2 MG: 1 INJECTION INTRAMUSCULAR; INTRAVENOUS at 14:57:00

## 2025-07-10 NOTE — OPERATIVE REPORT
OPERATIVE REPORT        PREOPERATIVE DIAGNOSIS: Family history of breast cancer with acquired absence of bilateral breasts.  POSTOPERATIVE DIAGNOSIS: Family history of breast cancer with acquired absence of bilateral breasts.  PROCEDURE PERFORMED:    Intraoperative assessment of mastectomy skin flap perfusion with indocyanine green laser imaging   Bilateral immediate breast reconstruction with silicone implants and acellular dermal matrix.     ASSISTANT: LAMIN Peter     ANESTHESIA:  General with endotracheal intubation.     ESTIMATED BLOOD LOSS:  10 mL.     COMPLICATIONS:  None.     INDICATIONS:  The patient is a 44 year old female with a history of multiple breast augmentation surgery and a strong family history of breast cancer..  The patient was seen by Dr. Garcia and elected to undergo bilateral skin-sparing mastectomies.  She was referred preoperatively to discuss reconstructive options and opted for immediate reconstruction with silicone implants and possible tissue expander.       FINDINGS:  Bilateral breasts reconstructed with Natrelle Inspira cohesive breast implants, 415 mL, reference number [id number].  On the right, serial # [id number].  On the left, serial # [id number].     PROCEDURE:  Informed consent was obtained from the patient.  The risks, benefits, and alternatives were reviewed with the patient preoperatively.  She expressed understanding and wished to proceed.  The patient was marked in the preoperative holding area in the upright position.  The midline and inframammary folds were marked.  Periareolar incisions were marked in conjunction with Dr. Garcia.  The patient was then taken to the operating room by Dr. Garcia where she underwent bilateral skin sparing mastectomy.  Once the mastectomies were completed,  the reconstructive portion began simultaneously began.    room and the plastic surgery portion of the procedure began.    The surgical site was re-draped sterilely.  The mastectomy skin  flaps were examined and appeared of suitable thickness of viability to facilitate immediate reconstruction.  The pockets were irrigated with warm saline irrigation until all particulate fat was evacuated. Hemostasis was then secured with electrocautery.  Next, the pockets were irrigated with Betadine irrigation and then with antibiotic irrigation until clear.      Given the possibility for direct to implant reconstruction, indocyanine green laser imaging was performed with appropriate sizers in place.  This showed excellent perfusion bilaterally and hence a decision was made to proceed with silicone implant placement.      Gloves were changed and 4 sheets of medium contour perforated AlloDerm were brought on the field and prepared in saline. Two sheets of AlloDerm were secured along the long axis the running 2-0 PDS suture.  The tissue expanders were then brought on the field and immediately bathed in  antibiotic irrigation.  They were checked for integrity and noted to be intact.  The AlloDerm was draped over the anterior surface of the tissue expander and a posterior pursestring suture of 2-0 PDS was then placed.  An identical construct was created for both sides.      Gloves were then changed and the surgical sites were isolated with Ioban.  The implant/AlloDerm constructs were then delivered via the incisions and sutured to the chest wall with interrupted 2-0 PDS sutures.     The patient was then placed in the upright position in good shape and symmetry is noted.  A dogear at the medial aspect of the right breast was excised and sent for permanent pathologic analysis.   Two 15-Amharic Nasim drains were exited through a lateral stab incision and sutured to the skin with 3-0 nylon suture. The skin was then closed with interrupted 3-0 Vicryl deep dermal suture and running 4-0 Monocryl subcuticular suture.     The drain sites were dressed with BioPatch and Tegaderm. The incisions dressed with Exofin, steristrips,  Fluff gauze, and a surgical bra. The patient was awakened, extubated, and taken to the recovery area in stable condition. There were no operative complications. All needle, sponge, and instrument counts were correct at the end of the procedure.

## 2025-07-10 NOTE — BRIEF OP NOTE
Pre-Operative Diagnosis: At high risk for breast cancer [Z91.89]  Family history of breast cancer [Z80.3]     Post-Operative Diagnosis: At high risk for breast cancer [Z91.89]Family history of breast cancer [Z80.3]      Procedure Performed:   Bilateral breast skin sparing mastectomies     Surgeons and Role:  Panel 1:     * Saskia Garcia MD - Primary    Assistant(s):  Surgical Assistant.: Cecile Erickson CSA     Surgical Findings: Hard calcified debris noted surrounding contracted implants.     Specimen: Bilateral breasts, bilateral breast implants     Estimated Blood Loss: 25cc    Saskia Garcia MD  7/10/2025  4:49 PM

## 2025-07-10 NOTE — ANESTHESIA POSTPROCEDURE EVALUATION
Patient: Nereyda Ramires    Procedure Summary       Date: 07/10/25 Room / Location: [facility] MAIN OR  / [facility] MAIN OR    Anesthesia Start: 1447 Anesthesia Stop: 1853    Procedures:       Bilateral breast skin sparing mastectomies (Jose), Immediate breast reconstruction with  acellular dermal matrix with  direct  silicone implant (Brant) (Bilateral: Breast)      BREAST RECONSTRUCTION/ IMMEDIATE/EXPANDER (Bilateral: Breast) Diagnosis:       At high risk for breast cancer      Family history of breast cancer      (At high risk for breast cancer [Z91.89]Family history of breast cancer [Z80.3])    Surgeons: Saskia Garcia MD; Herber Guo MD Anesthesiologist: Mook Thomas MD    Anesthesia Type: general ASA Status: 2            Anesthesia Type: No value filed.    Vitals Value Taken Time   /61 07/10/25 18:46   Temp 97.5 °F (36.4 °C) 07/10/25 18:35   Pulse 77 07/10/25 18:53   Resp 12 07/10/25 18:53   SpO2 99 % 07/10/25 18:53   Vitals shown include unfiled device data.    [facility] AN Post Evaluation:   Patient Evaluated in PACU  Patient Participation: complete - patient participated  Level of Consciousness: awake  Pain Management: adequate  Airway Patency:patent  Dental exam unchanged from preop  Yes    Cardiovascular Status: acceptable  Respiratory Status: acceptable  Postoperative Hydration acceptable      Mook Thomas MD  7/10/2025 6:53 PM

## 2025-07-10 NOTE — OPERATIVE REPORT
[facility]    PATIENT'S NAME: EFRAIN BRUSH   ATTENDING PHYSICIAN: Saskia Garcia MD   OPERATING PHYSICIAN: Saskia Garcia MD   PATIENT ACCOUNT#:   [id number]    LOCATION:  [address]  MEDICAL RECORD #:   [id number]       YOB: 1980  ADMISSION DATE:       07/10/2025      OPERATION DATE:  07/10/2025    OPERATIVE REPORT      PREOPERATIVE DIAGNOSIS:  High risk for the development of breast cancer with family history of breast cancer.  POSTOPERATIVE DIAGNOSIS:  High risk for the development of breast cancer with family history of breast cancer.  PROCEDURE:  Bilateral breast skin-sparing mastectomies.  Her reconstruction will be dictated separately by Dr. Herber Guo.    ASSISTANT:  Cecile Erickson CSA.    ANESTHESIA:  General anesthesia and pectoral nerve blocks placed per Anesthesia.    ESTIMATED BLOOD LOSS:  For my portion of the procedure is 25 mL.    DRAINS:  Placed per Plastic Surgery.    COMPLICATIONS:  No immediate complications.    DISPOSITION:  Patient remains in OR for her reconstruction with Dr. Guo.    INDICATIONS:  The patient is a 44-year-old female.  She is presenting with high risk for the development of breast cancer secondary to a strong family history.  She has had multiple biopsies as well as dense breast tissue and due to the risk she is recommended for a bilateral preventive mastectomy.  She is also noted to have a recall on her current breast implants with scarring, contracture, and issues regarding pain and discomfort related to her implants for which she will simultaneously undergo explantation and then a reconstruction with Dr. Guo.  Risks and possible complications were discussed with the patient including, but not limited to, infection, bleeding, injury to surrounding structures, possible need for reoperation.  Because of her desire for skin preservation, the unusual procedure of skin-sparing mastectomy instead of the usual  procedure of simple mastectomy was necessary.  This required surgical assistance in order to allow for adequate exposure to complete this operation through a difficult incision.  This also required additional time, efforts, and increased complexity to assure for adequate skin perfusion and skin flaps to accommodate her immediate reconstruction.  Additionally, given the severely calcified and contracted breast implants and prior surgical scar tissue, this required additional time, efforts, in order to explant her previous implants with success.  Risks and possible complications including, but not limited to, infection, bleeding, injury to surrounding structures, possible need for reoperation were discussed with the patient.  She agreed to proceed.    OPERATIVE TECHNIQUE:  Patient was brought to the OR, placed in supine position, properly padded and secured, given a dose of IV antibiotics, and sequential compression devices were applied to legs for DVT prophylaxis.  General anesthesia was induced.  Bilateral pectoral nerve blocks placed per Anesthesia.  Bilateral breasts were prepped and draped in usual sterile fashion.  Attention was first taken toward the right breast where a circumareolar incision was incised with a 15 blade knife in the skin based on markings that were placed preoperatively by Dr. Guo.  Through this, mastectomy flaps were raised to the borders of the clavicle, sternum, inframammary fold, and latissimus tendon laterally, and the right breast was then dissected off the underlying muscle with electrocautery including en bloc excision of the pectoralis fascia.  During this process, she was noted to have a severely contracted and capsular defect involving the prior implant.  The implant was difficult to remove from the breast tissue and using sharp dissection and electrocautery, I was able to disconnect this from the mammary gland, and this was placed on back table for inspection per Dr. Guo.   The right breast was dissected off the underlying muscle successfully including the en bloc excision of the pectoralis fascia.  It was oriented with a suture at the axillary tail and sent for routine permanent pathologic evaluation.  Wound was irrigated.  Hemostasis was assured with electrocautery and hemoclips, and a moist laparotomy pad was left in place for the reconstruction.  Attention was taken toward the contralateral right breast where a circumareolar symmetrical incision was made with a 15 blade knife in the skin and through this, mastectomy flaps were raised to the borders of the clavicle, sternum, inframammary fold, and latissimus tendon laterally, and the left breast was then dissected off the underlying muscle with electrocautery including en bloc excision of the pectoralis fascia.  I excised the capsule of the prior implant including the calcified implant en bloc with this.  The implant was detached from the breast tissue and placed on the back table for inspection per Dr. Guo.  The left breast was oriented with a suture at the axillary tail and sent for routine permanent pathologic evaluation.  Wound was irrigated.  Hemostasis was assured with electrocautery and hemoclips, and a moist laparotomy pad was left in place for the remainder of the surgery which will be dictated separately by Dr. Herber Guo.  All counts were correct at the conclusion of my portion of the procedure.  The patient was hemodynamically stable upon my exit from the OR.    Dictated By Saskia Garcia MD  d: 07/10/2025 16:53:23  t: 07/10/2025 16:59:40  Job [id number]  CMG/    cc: MD Lucy Craven MD

## 2025-07-10 NOTE — PROGRESS NOTES
Plan for bilateral SS-mastectomy by Dr. Garcia and immediate reconstruction with tissue expander vs silicone implant and acellular dermal matrix reviewed with patient. The risks of surgery including but not limited to bleeding, infection, scarring, delayed wound healing, seroma, asymmetry, implant infection/extrusion requiring removal, expander deflation, injury to adjacent structures, capsular contracture, ALCL, and need for further surgery were reviewed. The expected post-operative course was discussed. Questions were answered to the patient's satisfaction. No guarantees as to outcome were offered. The patient expresses understanding and wishes to proceed.

## 2025-07-10 NOTE — ANESTHESIA PROCEDURE NOTES
Peripheral Block    Date/Time: 7/10/2025 3:15 PM    Performed by: Isai Mckeon MD  Authorized by: Isai Mckeon MD      General Information and Staff    Start Time:  7/10/2025 3:15 PM  End Time:  7/10/2025 3:20 PM  Anesthesiologist:  Isai Mckeon MD  Performed by:  Anesthesiologist  Patient Location:  OR    Block Placement: Post Induction  Site Identification: real time ultrasound guided and image stored and retrievable    Block site/laterality marked before start: site marked  Reason for Block: at surgeon's request and post-op pain management    Preanesthetic Checklist: 2 patient identifers, IV checked, risks and benefits discussed, monitors and equipment checked, pre-op evaluation, timeout performed, anesthesia consent, sterile technique used, no prohibitive neurological deficits and no local skin infection at insertion site      Procedure Details    Patient Position:  Supine  Prep: ChloraPrep    Monitoring:  Heart rate, cardiac monitor, continuous pulse ox and blood pressure cuff  Block Type:  PEC1 and PEC2  Laterality:  Bilateral  Injection Technique:  Single-shot    Needle    Needle Type:  Echogenic  Needle Gauge:  20 G  Needle Length:  90 mm  Needle Localization:  Ultrasound guidance  Reason for Ultrasound Use: appropriate spread of the medication was noted in real time          Catheter Size:  20 G    Assessment    Injection Assessment:  Good spread noted, incremental injection, local visualized surrounding nerve on ultrasound, low pressure, negative aspiration for heme, negative resistance and no pain on injection  Paresthesia Pain:  None  Heart Rate Change: No    - Patient tolerated block procedure well without evidence of immediate block related complications.     Medications  7/10/2025 3:15 PM      Additional Comments

## 2025-07-10 NOTE — ANESTHESIA PROCEDURE NOTES
Airway  Date/Time: 7/10/2025 3:04 PM  Reason: Elective    Airway not difficult    General Information and Staff   Patient location during procedure: OR  Anesthesiologist: Isai Mckeon MD  Performed: anesthesiologist   Performed by: Isai Mckeon MD  Authorized by: Isai Mckeon MD        Indications and Patient Condition  Indications for airway management: anesthesia  Sedation level: deep      Preoxygenated: yesPatient position: sniffing    Mask difficulty assessment: 1 - vent by mask    Final Airway Details    Final airway type: endotracheal airway    Successful airway: ETT  Cuffed: yes   Successful intubation technique: direct laryngoscopy  Facilitating devices/methods: intubating stylet  Endotracheal tube insertion site: oral  Blade: Yessy  Blade size: #4    Cormack-Lehane Classification: grade I - full view of glottis  Placement verified by: capnometry   Inital cuff pressure (cm H2O): 20  Measured from: teeth  Number of attempts at approach: 1  Number of other approaches attempted: 0

## 2025-07-10 NOTE — ANESTHESIA PREPROCEDURE EVALUATION
Anesthesia PreOp Note    HPI:     Nereyda Ramires is a 44 year old female who presents for preoperative consultation requested by: Saskia Garcia MD    Date of Surgery: 7/10/2025    Procedure(s):  Bilateral breast nipple sparing mastectomies (Jose), Immediate breast reconstruction with placement of bilateral breast tissue expanders and acellular dermal matrix, possible direct to silicone implant (Brant)  BREAST RECONSTRUCTION/ IMMEDIATE/EXPANDER  Indication: At high risk for breast cancer [Z91.89]  Family history of breast cancer [Z80.3]    Relevant Problems   No relevant active problems       NPO:                         History Review:  Patient Active Problem List    Diagnosis Date Noted    Family history of breast cancer 02/27/2025    Dense breast tissue 02/27/2025    Family history of breast cancer in first degree relative 05/05/2023    At high risk for breast cancer 05/05/2023    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  Lab Results   Component Value Date    WBC 4.5 06/10/2025    RBC 5.15 (H) 06/10/2025    HGB 11.2 (L) 06/10/2025    HCT 39.0 06/10/2025    MCV 75.7 (L) 06/10/2025    MCH 21.7 (L) 06/10/2025    MCHC 28.7 (L) 06/10/2025    RDW 14.9 06/10/2025     06/10/2025     Lab Results   Component Value Date     06/10/2025    K 4.1 06/10/2025     06/10/2025    CO2 24 06/10/2025    BUN 15 06/10/2025    CREATSERUM 0.82 06/10/2025    GLU 80 06/10/2025    CA 9.4 06/10/2025          Vital Signs:  Body mass index is 21.03 kg/m².   height is 1.575 m (5' 2\") and weight is 52.2 kg (115 lb).   Vitals:    07/07/25 1346   Weight: 52.2 kg (115 lb)   Height: 1.575 m (5' 2\")        Anesthesia Evaluation     Patient summary reviewed    Airway    Mallampati: I  Neck ROM: full  Dental - Dentition appears grossly intact     Pulmonary     breath sounds clear to auscultation  Cardiovascular     Rhythm: regular  Rate: normal  ROS comment: ·Beta thalassemia minor          Neuro/Psych    (+)  headaches,        Comments: Migraine    GI/Hepatic/Renal    (+) GERD    Endo/Other    Abdominal                  Anesthesia Plan:   ASA:  2  Plan:   General  Airway:  ETT  Plan Comments: B/l PECS blocks  Informed Consent Plan and Risks Discussed With:  Patient      I have informed Nereyda Ramires and/or legal guardian or family member of the nature of the anesthetic plan, benefits, risks including possible dental damage if relevant, major complications, and any alternative forms of anesthetic management.   All of the patient's questions were answered to the best of my ability. The patient desires the anesthetic management as planned.  Isai Mckeon MD  7/10/2025 12:17 PM  Present on Admission:  **None**           [1]   Past Medical History:   Beta thalassemia minor    Disorder of thyroid    Esophageal reflux    Hx of motion sickness    Migraine    Migraines    PONV (postoperative nausea and vomiting)   [2]   Past Surgical History:  Procedure Laterality Date    Abdominoplasty      Dr. Remi Curiel          , , 2014    Hernia surgery  2016    Needle biopsy right      Other surgical history  2008    bilateral breast augmentation, implants Dr Curiel   [3]   No medications prior to admission.   [4]   No current Epic-ordered facility-administered medications on file.     Current Outpatient Medications Ordered in Epic   Medication Sig Dispense Refill    UBRELVY 100 MG Oral Tab Take 100 mg by mouth daily as needed. 30 tablet 0    Atogepant (QULIPTA) 10 MG Oral Tab Take 10 mg by mouth daily. 90 tablet 1    pantoprazole (PROTONIX) 40 MG Oral Tab EC Take 1 tablet (40 mg total) by mouth every morning before breakfast. 90 tablet 1    NP THYROID 30 MG Oral Tab  TAKE 1 TABLET BY MOUTH EVERY DAY 90 tablet 0   [5]   Allergies  Allergen Reactions    Shellfish ANAPHYLAXIS   [6]   Family History  Problem Relation Age of Onset    No Known Problems Father     Breast Cancer Mother 49        BRCA negative    Brain Cancer Maternal Grandfather     Breast Cancer Maternal Aunt 46    Breast Cancer Maternal Aunt 50    Breast Cancer Maternal Aunt 50    Prostate Cancer Maternal Uncle 52   [7]   Social History  Socioeconomic History    Marital status:    Tobacco Use    Smoking status: Never    Smokeless tobacco: Never   Vaping Use    Vaping status: Never Used   Substance and Sexual Activity    Alcohol use: Yes     Alcohol/week: 3.0 standard drinks of alcohol     Types: 3 Standard drinks or equivalent per week     Comment: 2-3x week    Drug use: Never

## 2025-07-10 NOTE — H&P
History of Present Illness:   Ms. Nereyda Ramires is a 44 year old woman who presents with high risk for development of breast cancer secondary to her strong family history of breast cancer and history of personal breast biopsies and dense breast tissue.  Acutely, she denies any palpable masses, nipple discharge or axillary symptoms.  She does have consistent pain in her breast.  She has a history of a bilateral breast augmentation from  and has had some pain and scar tissue related to her implants.  She does have a strong family history of breast cancer with no known genetic mutation in her family.  Prior breast biopsies have included biopsies in  and  which demonstrated benign fibroadenoma and at least 1 location.  She had her most recent bilateral screening mammogram on 2024 which showed extremely dense breast tissue with intact biopsy clips of the right breast and no suspicious new changes.  She had a high risk MRI on 2024 that showed intact bilateral prepectoral silicone implants with some fluid along the fold of the prosthesis bilaterally and no suspicious enhancement of the breast tissue on either side.  She has been seen by plastic surgery and was noted to have a recall of her current breast implants.  She is motivated to proceed with a bilateral risk reducing mastectomy to lower future breast cancer risk and presents today to discuss.         Past Medical History       Past Medical History:    Beta thalassemia minor    Migraine            Past Surgical History         Past Surgical History:   Procedure Laterality Date    Abdominoplasty   2016     Dr. Remi Curiel             , ,     Hernia surgery   2016    Needle biopsy right        Other surgical history   2008     bilateral breast augmentation, implants Dr Curiel            Gynecological History:  Pt is a   Pt was 29 years old at time of first pregnancy.    She has cumulative  breastfeeding history of 1 months.  She achieved menarche at age 13. and LMP unknown, pt has an IUD  She denies any history of hormone replacement therapy   She has history of oral contraceptive use for 6 years, last in 2004.  She denies infertility treatment to achieve pregnancy.     Medications:    Current Medications   No outpatient medications have been marked as taking for the 2/26/25 encounter (Appointment) with Saskia Garcia MD.            Allergies:    [Allergies]    [Allergies]       Allergen Reactions    Shellfish ANAPHYLAXIS        Family History:   Family History         Family History   Problem Relation Age of Onset    Breast Cancer Mother 49         BRCA negative    Brain Cancer Maternal Grandfather      Breast Cancer Maternal Aunt 46    Breast Cancer Maternal Aunt 50    Breast Cancer Maternal Aunt 50    Prostate Cancer Maternal Uncle 52            She is not of Ashkenazi [employer] ancestry.     Social History:      History   Alcohol Use    3.0 standard drinks of alcohol/week    3 Standard drinks or equivalent per week             History   Smoking Status    Never   Smokeless Tobacco    Never      Ms. Nereyda aRmires is  with 3 children. She has 7 siblings. She is currently Employed full-time     Review of Systems:  General:   The patient denies, fever, chills, +night sweats, fatigue, generalized weakness, change in appetite or weight loss.     HEENT:     The patient denies eye irritation, cataracts, redness, glaucoma, yellowing of the eyes, change in vision, color blindness, or +wearing contacts/glasses. The patient denies hearing loss, ringing in the ears, ear drainage, earaches, nasal congestion, nose bleeds, snoring, pain in mouth/throat, hoarseness, change in voice, facial trauma.     Respiratory:  The patient denies chronic cough, phlegm, hemoptysis, pleurisy/chest pain, pneumonia, asthma, wheezing, difficulty in breathing with exertion, emphysema, chronic bronchitis, shortness of  breath or abnormal sound when breathing.      Cardiovascular:  There is no history of chest pain, chest pressure/discomfort, palpitations, irregular heartbeat, fainting or near-fainting, difficulty breathing when lying flat, SOB/Coughing at night, swelling of the legs or chest pain while walking.     Breasts:  See history of present illness     Gastrointestinal:     There is no history of difficulty or pain with swallowing, reflux symptoms, vomiting, dark or bloody stools, constipation, yellowing of the skin, indigestion, nausea, change in bowel habits, diarrhea, abdominal pain or vomiting blood.      Genitourinary:  The patient denies frequent urination, needing to get up at night to urinate, urinary hesitancy or retaining urine, painful urination, urinary incontinence, decreased urine stream, blood in the urine or vaginal/penile discharge.     Skin:    The patient denies rash, itching, skin lesions, dry skin, change in skin color or change in moles.      Hematologic/Lymphatic:  The patient denies easily bruising or bleeding or persistent swollen glands or lymph nodes.      Musculoskeletal:  The patient denies muscle aches/pain, joint pain, stiff joints, neck pain, back pain or bone pain.     Neuropsychiatric:  There is no history of +migraines or severe headaches, seizure/epilepsy, speech problems, coordination problems, trembling/tremors, fainting/black outs, dizziness, memory problems, loss of sensation/numbness, problems walking, weakness, tingling or burning in hands/feet. There is no history of abusive relationship, bipolar disorder, sleep disturbance, anxiety, depression or feeling of despair.     Endocrine:    There is no history of poor/slow wound healing, weight loss/gain, fertility or hormone problems, cold intolerance, thyroid disease.      Allergic/Immunologic:  There is no history of hives, hay fever, angioedema or anaphylaxis.     /76 (BP Location: Left arm, Patient Position: Sitting, Cuff  Size: adult)   Pulse 86   Temp 96.8 °F (36 °C) (Temporal)   Resp 16   Wt 53.1 kg (117 lb)   SpO2 96%   BMI 21.40 kg/m²      Physical Exam:  The patient is an alert, oriented, well-nourished and  well-developed woman who appears her stated age. Her speech patterns and movements are normal. Her affect is appropriate.     HEENT: The head is normocephalic. The neck is supple. The thyroid is not enlarged and is without palpable masses/nodules. There are no palpable masses. The trachea is in the midline. Conjunctiva are clear, non-icteric.     Chest: The chest expands symmetrically. The lungs are clear to auscultation.     Heart: The rhythm is regular.  There are no murmurs, rubs, gallops or thrills.     Breasts:  Her breasts are symmetrical with a cup size 32DDD.  There are bilateral well-healed incisions with intact subglandular bilateral breast implants.  Right breast: The skin, nipple ,and areola appear normal. There is no skin dimpling with movement of the pectoralis. There is no nipple retraction. No nipple discharge can be elicited. The parenchyma is mildly nodular. There are no dominant masses in the breast. The axillary tail is normal.  Left breast:   The skin, nipple, and areola appear normal. There is no skin dimpling with movement of the pectoralis. There is no nipple retraction. No nipple discharge can be elicited. The parenchyma is mildly nodular. There are no dominant masses in the breast. The axillary tail is normal.     Abdomen:  The abdomen is soft, flat and non tender. The liver is not enlarged. There are no palpable masses.     Lymph Nodes:  The supraclavicular, axillary and cervical regions are free of significant lymphadenopathy.     Back: There is no vertebral column tenderness.     Skin: The skin appears normal. There are no suspicious appearing rashes or lesions.     Extremities: The extremities are without deformity, cyanosis or edema.     Impression:   Ms. Nereyda Ramires is a 44 year  old woman presents with strong family history of breast cancer, dense breast tissue, history of benign right breast biopsies, history of bilateral breast augmentation with recalled breast implants and breast pain.     Discussion and Plan:  I had a discussion with the Patient regarding her breast exam. On exam today I found no evidence of clinical concern in bilateral breast tissue.  I personally reviewed the recent imaging which is concordant with her clinical exam.  She is due for her next high rescreening MRI in March 2025 and I have advised that she proceed with this prior to any surgical scheduling.  She is interested in a bilateral risk reducing nipple sparing mastectomy for maximal risk reduction given her high risk for development of breast cancer secondary to her family history.  The risks and possible complications of this procedure were discussed at length with the patient.  Additionally, the patient has met with plastic surgery to discuss her reconstructive options.  I will touch base with her with the MRI results prior to surgical planning.  She was given ample opportunity for questions and those questions were answered to her satisfaction. She has been  encouraged to contact the office with any questions or concerns prior to her next appointment.      This note was created by Dragon voice recognition. Errors in content may be related to improper recognition by the system; efforts to review and correct have been done but errors may still exist. Please be advised the primary purpose of this note is for me to communicate medical care. Standard sentence structure is not always used. Medical terminology and medical abbreviations may be used. There may be grammatical, typographical, and automated fill ins that may have errors missed in proofreading.    Pre-op Diagnosis: At high risk for breast cancer [Z91.89]  Family history of breast cancer [Z80.3]    The above referenced H&P was reviewed by Saskia  MD Jose on 7/10/2025, the patient was examined and no significant changes have occurred in the patient's condition since the H&P was performed.  I discussed with the patient and/or legal representative the potential benefits, risks and side effects of this procedure; the likelihood of the patient achieving goals; and potential problems that might occur during recuperation.  I discussed reasonable alternatives to the procedure, including risks, benefits and side effects related to the alternatives and risks related to not receiving this procedure.  We will proceed with procedure as planned.

## 2025-07-11 ENCOUNTER — TELEPHONE (OUTPATIENT)
Dept: SURGERY | Facility: CLINIC | Age: 45
End: 2025-07-11

## 2025-07-11 NOTE — TELEPHONE ENCOUNTER
Received a call from [employer] asking if patient has had her surgery on 7/10/25. Shania was called back and notified that she indeed did have surgery. Shania had no other questions at this time

## 2025-07-16 ENCOUNTER — OFFICE VISIT (OUTPATIENT)
Facility: CLINIC | Age: 45
End: 2025-07-16
Payer: COMMERCIAL

## 2025-07-16 VITALS
HEART RATE: 78 BPM | BODY MASS INDEX: 21 KG/M2 | OXYGEN SATURATION: 100 % | SYSTOLIC BLOOD PRESSURE: 117 MMHG | WEIGHT: 113 LBS | DIASTOLIC BLOOD PRESSURE: 70 MMHG

## 2025-07-16 DIAGNOSIS — R92.30 DENSE BREAST TISSUE: ICD-10-CM

## 2025-07-16 DIAGNOSIS — Z90.13 ABSENCE OF BREAST, BILATERAL: Primary | ICD-10-CM

## 2025-07-16 DIAGNOSIS — Z80.3 FAMILY HISTORY OF BREAST CANCER: Primary | ICD-10-CM

## 2025-07-16 DIAGNOSIS — D24.1 FIBROADENOMA OF RIGHT BREAST IN FEMALE: ICD-10-CM

## 2025-07-16 DIAGNOSIS — Z91.89 AT HIGH RISK FOR BREAST CANCER: ICD-10-CM

## 2025-07-16 PROCEDURE — 99024 POSTOP FOLLOW-UP VISIT: CPT | Performed by: SURGERY

## 2025-07-16 PROCEDURE — 99024 POSTOP FOLLOW-UP VISIT: CPT

## 2025-07-16 NOTE — PROGRESS NOTES
Nereyda Ramires is a 44 year old female who presents today for a follow-up after Bilateral breast skin-sparing mastectomies (Dr. Garcia) and Intraoperative assessment of mastectomy skin flap perfusion with indocyanine green laser imaging and bilateral immediate breast reconstruction with silicone implants (Natrelle Inspira cohesive breast implants, 415 mL) and acellular dermal matrix with Dr. Guo on 7/10/2025.     She denies fever and chills. She denies nausea, vomiting, or diarrhea.  Patient does report to patient.  Patient denies any abdominal discomfort.  Patient has been compliant with her Colace use.  Patient was encouraged to increase her water consumption and ambulation.  Patient was also instructed to use MiraLAX.  Patient was encouraged to reach out to our office if constipation does not improve.  Her pain is controlled.      Physical Exam     Breasts: Bilateral breast incisions are clean, dry, intact.  No evidence of hematoma or seroma bilaterally.  Bilateral mastectomy skin is without erythema.  Acceptable shape and symmetry.  Bilateral breast drain sites are clean, dry, intact with serous fluid present.    There were no vitals filed for this visit.      Assessment and Plan     Nereyda Ramires is doing well s/p  Bilateral breast skin-sparing mastectomies (Dr. Garcia) and Intraoperative assessment of mastectomy skin flap perfusion with indocyanine green laser imaging and bilateral immediate breast reconstruction with silicone implants (Natrelle Inspira cohesive breast implants, 415 mL) and acellular dermal matrix with Dr. Guo on 7/10/2025.     Patient is here today for her first postoperative visit.  The bilateral breast incisions were redressed with new Steri-Strips.  1 drain from each breast was removed today due to low volume output.  The patient tolerated this well.  A dressing of Neosporin, 2 x 2, Tegaderm was placed.  1 drain was left in each breast today due to not meeting her  parameters for removal.  These drains were redressed with a new Biopatch and Tegaderm.  Drain care and parameters were reviewed with the patient and her .  The patient will contact the office when her remaining drains are ready to be removed.    Additional ABD pads were placed in the patient's bra per her comfort.  Compression activity guidelines reviewed with the patient.  The patient will follow up with Dr. Guo for her postoperative visit on 8-19.  She was encouraged to contact the office with any additional questions or concerns.    Questions were answered. Patient understands.     The 21st Century Cures Act makes medical notes like these available to patients in the interest of transparency. Please be advised this is a medical document. Medical documents are intended to carry relevant information, facts as evident, and the clinical opinion of the practitioner. The medical note is intended as peer to peer communication and may appear blunt or direct. It is written in medical language and may contain abbreviations or verbiage that are unfamiliar.     Meme Reardon, MATRINA  7/16/2025  2:08 PM

## 2025-07-21 ENCOUNTER — OFFICE VISIT (OUTPATIENT)
Facility: CLINIC | Age: 45
End: 2025-07-21
Payer: COMMERCIAL

## 2025-07-21 ENCOUNTER — TELEPHONE (OUTPATIENT)
Facility: CLINIC | Age: 45
End: 2025-07-21

## 2025-07-21 DIAGNOSIS — Z90.13 ABSENCE OF BREAST, BILATERAL: Primary | ICD-10-CM

## 2025-07-21 PROCEDURE — 99024 POSTOP FOLLOW-UP VISIT: CPT

## 2025-07-21 NOTE — PROGRESS NOTES
Nereyda Ramires is a 44 year old female who presents today for a follow-up after bilateral breast skin-sparing mastectomies (Dr. Garcia) and intraoperative assessment of mastectomy skin flap perfusion with indocyanine green laser imaging, bilateral immediate breast reconstruction with silicone implants (Natrelle Inspira cohesive breast implants, 415 mL) and acellular dermal matrix (Dr. Guo) on 7/10/2025.    She denies fever and chills. She denies nausea, vomiting, diarrhea or constipation.   Her pain is controlled.  She presents today for drain check.    Physical Exam     Breasts: Bilateral breast incisions are clean, dry, and intact.  No evidence of wound dehiscence or drainage.  No evidence of hematoma or seroma.  Drain sites are clean, dry, and intact serosanguineous drainage.  Bilateral breast implants with acceptable shape and symmetry.    There were no vitals filed for this visit.    Assessment and Plan     Nereyda Ramires is doing well after bilateral breast skin-sparing mastectomies (Dr. Garcia) and intraoperative assessment of mastectomy skin flap perfusion with indocyanine green laser imaging, bilateral immediate breast reconstruction with silicone implants (Natrelle Inspira cohesive breast implants, 415 mL) and acellular dermal matrix (Dr. Guo) on 7/10/2025.    Her bilateral breast incisions remain clean, dry, and intact.  Steri-Strips were removed today, and her incisions were left open to air.  Due to low volume output, her remaining drains from each breast were removed today.  A dressing of Neosporin, gauze, and Tegaderm was placed over this areas.  She tolerated this well.  She will keep these areas covered until a scab forms.  She was provided with more supplies for this.  She may now shower, but we discussed that she should not be rubbing her incisions.  We also reviewed that she should not submerge into a bathtub or pool.    We reviewed continued activity restrictions and continued  compressions.     She will follow up with Dr. Guo on 8/19/2025 for a postop visit. She was encouraged to contact our office for questions or concerns. All her questions were addressed today. She verbalized understanding.      The 21st Century Cures Act makes medical notes like these available to patients in the interest of transparency. Please be advised this is a medical document. Medical documents are intended to carry relevant information, facts as evident, and the clinical opinion of the practitioner. The medical note is intended as peer to peer communication and may appear blunt or direct. It is written in medical language and may contain abbreviations or verbiage that are unfamiliar.     Lesly ACOSTA, APRN  7/21/2025  2:30 PM

## 2025-07-21 NOTE — TELEPHONE ENCOUNTER
Called pt regarding her drains 2, after reviewing her out put for Sun, Sat , both drains are ready to come out, appt for today at 2:30 in [facility] was offered, and she is coming in today.

## 2025-08-19 ENCOUNTER — OFFICE VISIT (OUTPATIENT)
Facility: CLINIC | Age: 45
End: 2025-08-19

## 2025-08-19 DIAGNOSIS — Z90.13 ABSENCE OF BREAST, ACQUIRED, BILATERAL: Primary | ICD-10-CM

## 2025-08-19 PROCEDURE — 99024 POSTOP FOLLOW-UP VISIT: CPT | Performed by: SURGERY

## 2025-08-27 ENCOUNTER — TELEPHONE (OUTPATIENT)
Dept: SURGERY | Facility: CLINIC | Age: 45
End: 2025-08-27

## 2025-08-27 DIAGNOSIS — Z90.13 ABSENCE OF BREAST, ACQUIRED, BILATERAL: Primary | ICD-10-CM

## (undated) DEVICE — GAMMEX® PI HYBRID SIZE 7.5, STERILE POWDER-FREE SURGICAL GLOVE, POLYISOPRENE AND NEOPRENE BLEND: Brand: GAMMEX

## (undated) DEVICE — SUCTION CANISTER, 3000CC,SAFELINER: Brand: DEROYAL

## (undated) DEVICE — SYRINGE BULB 50/CS 48/PLT: Brand: MEDEGEN MEDICAL PRODUCTS, LLC

## (undated) DEVICE — SOLUTION IRRIG 1000ML 0.9% NACL USP BTL

## (undated) DEVICE — INTENDED USE FOR SURGICAL MARKING ON INTACT SKIN, ALSO PROVIDES A PERMANENT METHOD OF IDENTIFYING OBJECTS IN THE OPERATING ROOM: Brand: WRITESITE® PLUS MINI PREP RESISTANT MARKER

## (undated) DEVICE — SUT PERMA- 2-0 18IN FS NABSRB BLK 26MM 3/8

## (undated) DEVICE — CLIP APPLIER: Brand: PREMIUM SURGICLIP II

## (undated) DEVICE — KIT PROC W/ DRP AND DRG COMP FOR PRTBL HNDHLD

## (undated) DEVICE — SLIM BODY SKIN STAPLER: Brand: APPOSE ULC

## (undated) DEVICE — GAMMEX® PI HYBRID SIZE 6.5, STERILE POWDER-FREE SURGICAL GLOVE, POLYISOPRENE AND NEOPRENE BLEND: Brand: GAMMEX

## (undated) DEVICE — PAD,ABDOMINAL,8"X7.5",STERILE,LF,1/PK: Brand: MEDLINE

## (undated) DEVICE — PACK,UNIVERSAL,SPLIT,II: Brand: MEDLINE

## (undated) DEVICE — ANTIBACTERIAL UNDYED BRAIDED (POLYGLACTIN 910), SYNTHETIC ABSORBABLE SUTURE: Brand: COATED VICRYL

## (undated) DEVICE — YANKAUER,FLEXIBLE HANDLE,REGLR CAPACITY: Brand: MEDLINE INDUSTRIES, INC.

## (undated) DEVICE — 3M™ IOBAN™ 2 ANTIMICROBIAL INCISE DRAPE 6650EZ: Brand: IOBAN™ 2

## (undated) DEVICE — CABLE BPLR L12FT FLYING LD DISP

## (undated) DEVICE — SUT PDS II 3-0 27IN SH ABSRB VLT L26MM 1/2

## (undated) DEVICE — MINOR GENERAL: Brand: MEDLINE INDUSTRIES, INC.

## (undated) DEVICE — SUT PDS II 2-0 27IN SH ABSRB VLT L26MM 1/2

## (undated) DEVICE — 3M™ TEGADERM™ HP TRANSPARENT FILM DRESSING FRAME STYLE, 9534HP, 2-3/8 X 2-3/4 IN (6 CM X 7 CM), 100/CT 4CT/CASE: Brand: 3M™ TEGADERM™

## (undated) DEVICE — BANDAGE,GAUZE,4.5"X4.1YD,STERILE,LF: Brand: MEDLINE

## (undated) DEVICE — APPLICATOR PREP 26ML CHG 2% ISO ALC 70%

## (undated) DEVICE — DRAPE TAPE: Brand: CONVERTORS

## (undated) DEVICE — Device

## (undated) DEVICE — ZZ-DISC-SUB-405166-SUT COAT VCRL 3-0 27IN SH ABSRB UD 26MM 1/2

## (undated) DEVICE — INSULATED BLADE ELECTRODE: Brand: EDGE

## (undated) DEVICE — 3M™ STERI-STRIP™ REINFORCED ADHESIVE SKIN CLOSURES, R1548, 1 IN X 5 IN (25 MM X 125 MM), 4 STRIPS/ENVELOPE: Brand: 3M™ STERI-STRIP™

## (undated) DEVICE — WECK HORIZON SMALL YELLOW CLIP DISP

## (undated) DEVICE — SUT PROL 2-0 30IN SH NABSRB BLU L26MM 1/2 CIR

## (undated) DEVICE — GAUZE,SPONGE,FLUFF,6"X6.75",STRL,5/TRAY: Brand: MEDLINE

## (undated) DEVICE — 4-WAY HIGH FLOW STOPCOCK W/ROTATING LUER: Brand: ICU MEDICAL

## (undated) DEVICE — SUT MCRYL 4-0 27IN ABSRB UD 19MM PS-2 3/8

## (undated) DEVICE — SUT ETHLN 3-0 30IN FS-1 NABSRB BLK 24MM 3/8 C

## (undated) DEVICE — SOLUTION PREP 10% POVIDONE IOD 32OZ BTL

## (undated) DEVICE — AEGIS 1" DISK 4MM HOLE, PEEL OPEN: Brand: MEDLINE

## (undated) DEVICE — SUT MCRYL 4-0 18IN PS-2 ABSRB UD 19MM 3/8 CIR

## (undated) DEVICE — MAJOR GENERAL: Brand: MEDLINE INDUSTRIES, INC.

## (undated) DEVICE — WECK HORIZON MED BLUE CLIP DISP

## (undated) DEVICE — 60 ML SYRINGE LUER-LOCK TIP: Brand: MONOJECT

## (undated) DEVICE — EVACUATOR SUR 100CC SIL BLB WND

## (undated) DEVICE — BLAKE SILICONE DRAIN, 15 FR ROUND, HUBLESS WITH 3/16" TROCAR: Brand: BLAKE

## (undated) DEVICE — 1010 S-DRAPE TOWEL DRAPE 10/BX: Brand: STERI-DRAPE™

## (undated) NOTE — LETTER
[facility] [address], [place], [place]    Authorization for Surgical Operation and Procedure                               I hereby authorize Herber Guo MD, my physician and his/her assistants (if applicable), which may include medical students, residents, and/or fellows, to perform the following surgical operation/ procedure and administer such anesthesia as may be determined necessary by my physician: Operation/Procedure name (s)  Immediate breast reconstruction with placement of bilateral breast tissue expanders and acellular dermal matrix, possible direct to silicone implant on Nereyda Ramires   2.   I recognize that during the surgical operation/procedure, unforeseen conditions may necessitate additional or different procedures than those listed above.  I, therefore, further authorize and request that the above-named surgeon, assistants, or designees perform such procedures as are, in their judgment, necessary and desirable.    3.   My surgeon/physician has discussed prior to my surgery the potential benefits, risks and side effects of this procedure; the likelihood of achieving goals; and potential problems that might occur during recuperation.  They also discussed reasonable alternatives to the procedure, including risks, benefits, and side effects related to the alternatives and risks related to not receiving this procedure.  I have had all my questions answered and I acknowledge that no guarantee has been made as to the result that may be obtained.    4.   Should the need arise during my operation/procedure, which includes change of level of care prior to discharge, I also consent to the administration of blood and/or blood products.  Further, I understand that despite careful testing and screening of blood or blood products by collecting agencies, I may still be subject to ill effects as a result of receiving a blood transfusion and/or blood products.  The following are some,  but not all, of the potential risks that can occur: fever and allergic reactions, hemolytic reactions, transmission of diseases such as Hepatitis, AIDS and Cytomegalovirus (CMV) and fluid overload.  In the event that I wish to have an autologous transfusion of my own blood, or a directed donor transfusion, I will discuss this with my physician.  Check only if Refusing Blood or Blood Products  I understand refusal of blood or blood products as deemed necessary by my physician may have serious consequences to my condition to include possible death. I hereby assume responsibility for my refusal and release the hospital, its personnel, and my physicians from any responsibility for the consequences of my refusal.    o  Refuse   5.   I authorize the use of any specimen, organs, tissues, body parts or foreign objects that may be removed from my body during the operation/procedure for diagnosis, research or teaching purposes and their subsequent disposal by hospital authorities.  I also authorize the release of specimen test results and/or written reports to my treating physician on the hospital medical staff or other referring or consulting physicians involved in my care, at the discretion of the Pathologist or my treating physician.    6.   I consent to the photographing or videotaping of the operations or procedures to be performed, including appropriate portions of my body for medical, scientific, or educational purposes, provided my identity is not revealed by the pictures or by descriptive texts accompanying them.  If the procedure has been photographed/videotaped, the surgeon will obtain the original picture, image, videotape or CD.  The hospital will not be responsible for storage, release or maintenance of the picture, image, tape or CD.    7.   I consent to the presence of a  or observers in the operating room as deemed necessary by my physician or their designees.    8.   I recognize that in the  event my procedure results in extended X-Ray/fluoroscopy time, I may develop a skin reaction.    9. If I have a Do Not Attempt Resuscitation (DNAR) order in place, that status will be suspended while in the operating room, procedural suite, and during the recovery period unless otherwise explicitly stated by me (or a person authorized to consent on my behalf). The surgeon or my attending physician will determine when the applicable recovery period ends for purposes of reinstating the DNAR order.  10. Patients having a sterilization procedure: I understand that if the procedure is successful the results will be permanent and it will therefore be impossible for me to inseminate, conceive, or bear children.  I also understand that the procedure is intended to result in sterility, although the result has not been guaranteed.   11. I acknowledge that my physician has explained sedation/analgesia administration to me including the risk and benefits I consent to the administration of sedation/analgesia as may be necessary or desirable in the judgment of my physician.    I CERTIFY THAT I HAVE READ AND FULLY UNDERSTAND THE ABOVE CONSENT TO OPERATION and/or OTHER PROCEDURE.     ____________________________________  _________________________________        ______________________________  Signature of Patient    Signature of Responsible Person                Printed Name of Responsible Person                                      ____________________________________  _____________________________                ________________________________  Signature of Witness        Date  Time         Relationship to Patient    STATEMENT OF PHYSICIAN My signature below affirms that prior to the time of the procedure; I have explained to the patient and/or his/her legal representative, the risks and benefits involved in the proposed treatment and any reasonable alternative to the proposed treatment. I have also explained the risks and  benefits involved in refusal of the proposed treatment and alternatives to the proposed treatment and have answered the patient's questions. If I have a significant financial interest in a co-management agreement or a significant financial interest in any product or implant, or other significant relationship used in this procedure/surgery, I have disclosed this and had a discussion with my patient.     _____________________________________________________              _____________________________  (Signature of Physician)                                                                                         (Date)                                   (Time)  Patient Name: Nereyda Ramires      : 1980      Printed: 2025     Medical Record #: [id number]                                      Page 1 of 1

## (undated) NOTE — LETTER
[facility]  [address], [place], [place]    Authorization for Surgical Operation and Procedure                               I hereby authorize Saskia Garcia MD, my physician and his/her assistants (if applicable), which may include medical students, residents, and/or fellows, to perform the following surgical operation/ procedure and administer such anesthesia as may be determined necessary by my physician: Operation/Procedure name (s) Bilateral breast nipple sparing mastectomies on Nereyda Ramires   2.   I recognize that during the surgical operation/procedure, unforeseen conditions may necessitate additional or different procedures than those listed above.  I, therefore, further authorize and request that the above-named surgeon, assistants, or designees perform such procedures as are, in their judgment, necessary and desirable.    3.   My surgeon/physician has discussed prior to my surgery the potential benefits, risks and side effects of this procedure; the likelihood of achieving goals; and potential problems that might occur during recuperation.  They also discussed reasonable alternatives to the procedure, including risks, benefits, and side effects related to the alternatives and risks related to not receiving this procedure.  I have had all my questions answered and I acknowledge that no guarantee has been made as to the result that may be obtained.    4.   Should the need arise during my operation/procedure, which includes change of level of care prior to discharge, I also consent to the administration of blood and/or blood products.  Further, I understand that despite careful testing and screening of blood or blood products by collecting agencies, I may still be subject to ill effects as a result of receiving a blood transfusion and/or blood products.  The following are some, but not all, of the potential risks that can occur: fever and allergic reactions, hemolytic  reactions, transmission of diseases such as Hepatitis, AIDS and Cytomegalovirus (CMV) and fluid overload.  In the event that I wish to have an autologous transfusion of my own blood, or a directed donor transfusion, I will discuss this with my physician.  Check only if Refusing Blood or Blood Products  I understand refusal of blood or blood products as deemed necessary by my physician may have serious consequences to my condition to include possible death. I hereby assume responsibility for my refusal and release the hospital, its personnel, and my physicians from any responsibility for the consequences of my refusal.    o  Refuse   5.   I authorize the use of any specimen, organs, tissues, body parts or foreign objects that may be removed from my body during the operation/procedure for diagnosis, research or teaching purposes and their subsequent disposal by hospital authorities.  I also authorize the release of specimen test results and/or written reports to my treating physician on the hospital medical staff or other referring or consulting physicians involved in my care, at the discretion of the Pathologist or my treating physician.    6.   I consent to the photographing or videotaping of the operations or procedures to be performed, including appropriate portions of my body for medical, scientific, or educational purposes, provided my identity is not revealed by the pictures or by descriptive texts accompanying them.  If the procedure has been photographed/videotaped, the surgeon will obtain the original picture, image, videotape or CD.  The hospital will not be responsible for storage, release or maintenance of the picture, image, tape or CD.    7.   I consent to the presence of a  or observers in the operating room as deemed necessary by my physician or their designees.    8.   I recognize that in the event my procedure results in extended X-Ray/fluoroscopy time, I may develop a skin  reaction.    9. If I have a Do Not Attempt Resuscitation (DNAR) order in place, that status will be suspended while in the operating room, procedural suite, and during the recovery period unless otherwise explicitly stated by me (or a person authorized to consent on my behalf). The surgeon or my attending physician will determine when the applicable recovery period ends for purposes of reinstating the DNAR order.  10. Patients having a sterilization procedure: I understand that if the procedure is successful the results will be permanent and it will therefore be impossible for me to inseminate, conceive, or bear children.  I also understand that the procedure is intended to result in sterility, although the result has not been guaranteed.   11. I acknowledge that my physician has explained sedation/analgesia administration to me including the risk and benefits I consent to the administration of sedation/analgesia as may be necessary or desirable in the judgment of my physician.    I CERTIFY THAT I HAVE READ AND FULLY UNDERSTAND THE ABOVE CONSENT TO OPERATION and/or OTHER PROCEDURE.     ____________________________________  _________________________________        ______________________________  Signature of Patient    Signature of Responsible Person                Printed Name of Responsible Person                                      ____________________________________  _____________________________                ________________________________  Signature of Witness        Date  Time         Relationship to Patient    STATEMENT OF PHYSICIAN My signature below affirms that prior to the time of the procedure; I have explained to the patient and/or his/her legal representative, the risks and benefits involved in the proposed treatment and any reasonable alternative to the proposed treatment. I have also explained the risks and benefits involved in refusal of the proposed treatment and alternatives to the proposed  treatment and have answered the patient's questions. If I have a significant financial interest in a co-management agreement or a significant financial interest in any product or implant, or other significant relationship used in this procedure/surgery, I have disclosed this and had a discussion with my patient.     _____________________________________________________              _____________________________  (Signature of Physician)                                                                                         (Date)                                   (Time)  Patient Name: Nereyda Ramires      : 1980      Printed: 2025     Medical Record #: [id number]                                      Page 1 of 1